# Patient Record
Sex: MALE | Race: BLACK OR AFRICAN AMERICAN | Employment: FULL TIME | URBAN - METROPOLITAN AREA
[De-identification: names, ages, dates, MRNs, and addresses within clinical notes are randomized per-mention and may not be internally consistent; named-entity substitution may affect disease eponyms.]

---

## 2017-06-26 ENCOUNTER — HOSPITAL ENCOUNTER (EMERGENCY)
Age: 22
Discharge: HOME | End: 2017-06-26
Attending: EMERGENCY MEDICINE
Payer: SELF-PAY

## 2017-06-26 VITALS
WEIGHT: 229.9 LBS | OXYGEN SATURATION: 100 % | DIASTOLIC BLOOD PRESSURE: 80 MMHG | SYSTOLIC BLOOD PRESSURE: 120 MMHG | HEART RATE: 72 BPM | BODY MASS INDEX: 37.11 KG/M2 | TEMPERATURE: 98.4 F | RESPIRATION RATE: 18 BRPM

## 2017-06-26 DIAGNOSIS — S61.219A LACERATION OF FINGER, INITIAL ENCOUNTER: Primary | ICD-10-CM

## 2017-06-26 PROCEDURE — 75810000293 HC SIMP/SUPERF WND  RPR

## 2017-06-26 PROCEDURE — 75810000294 HC INTERM/LAYERED WND RPR

## 2017-06-26 PROCEDURE — 99283 EMERGENCY DEPT VISIT LOW MDM: CPT

## 2017-06-26 PROCEDURE — 77030031132 HC SUT NYL COVD -A

## 2017-06-26 PROCEDURE — 77030018836 HC SOL IRR NACL ICUM -A

## 2017-06-26 PROCEDURE — 77030008323 HC SPLNT FNGR GTR DJOR -A

## 2017-06-26 NOTE — ED PROVIDER NOTES
Patient is a 24 y.o. male presenting with skin laceration. The history is provided by the patient and a relative. Laceration         Pt reports cutting his right fourth finger on can tonight. Tetanus up to date. Left hand dominant. Denies ETOh, tobacco, illicits. Past Medical History:   Diagnosis Date    Lead poisoning        History reviewed. No pertinent surgical history. History reviewed. No pertinent family history. Social History     Social History    Marital status: SINGLE     Spouse name: N/A    Number of children: N/A    Years of education: N/A     Occupational History    Not on file. Social History Main Topics    Smoking status: Never Smoker    Smokeless tobacco: Never Used    Alcohol use No    Drug use: No    Sexual activity: Not on file     Other Topics Concern    Not on file     Social History Narrative         ALLERGIES: Review of patient's allergies indicates no known allergies. Review of Systems   Skin: Positive for wound. All other systems reviewed and are negative. Vitals:    06/26/17 0033   BP: (!) 136/97   Pulse: 69   Resp: 15   Temp: 98.4 °F (36.9 °C)   SpO2: 99%   Weight: 104.3 kg (229 lb 14.4 oz)            Physical Exam   Constitutional: Vital signs are normal. He appears well-developed and well-nourished. He is active. Non-toxic appearance. He does not appear ill. No distress. HENT:   Head: Normocephalic and atraumatic. Neck: Normal range of motion. Neck supple. Carotid bruit is not present. No tracheal deviation present. No thyromegaly present. Cardiovascular: Normal rate, regular rhythm and normal heart sounds. Exam reveals no gallop and no friction rub. No murmur heard. Pulmonary/Chest: Effort normal and breath sounds normal. No stridor. No respiratory distress. He has no wheezes. He has no rales. He exhibits no tenderness. Abdominal: Soft. He exhibits no distension and no mass. There is no tenderness.  There is no rebound, no guarding and no CVA tenderness. Musculoskeletal: Normal range of motion. Neurological: He is alert. Skin: Skin is warm, dry and intact. He is not diaphoretic. No pallor. Right hand: fourth finger's proximal phalanx on the palmar aspect there is a horizontal laceration, approx 1cm in length, not actively bleeding. FROM. Sensation intact. Cap refill <2 sec. Psychiatric: He has a normal mood and affect. His speech is normal and behavior is normal. Judgment and thought content normal.   Nursing note and vitals reviewed. MDM  Number of Diagnoses or Management Options  Laceration of finger, initial encounter:   Diagnosis management comments: Laceration repaired. Splint applied. Splint applied by nurse to right fourth finger; excellent position. N/v intact before and after application. ED Course       Wound Repair  Date/Time: 6/26/2017 1:28 AM  Performed by: Robin Winters provider: Alden Bell  Preparation: skin prepped with Betadine  Location details: right ring finger  Wound length:2.5 cm or less  Anesthesia: local infiltration    Anesthesia:  Anesthesia: local infiltration  Local Anesthetic: lidocaine 1% without epinephrine   Anesthetic total: 2 mL  Foreign bodies: no foreign bodies  Irrigation solution: saline  Irrigation method: syringe  Debridement: none  Fascia closure: 5-0 nylon  Number of sutures: 3  Technique: simple and interrupted  Dressing: splint  Patient tolerance: Patient tolerated the procedure well with no immediate complications  My total time at bedside, performing this procedure was 1-15 minutes. 1:29 AM  Diagnosis:   1.  Laceration of finger, initial encounter          Disposition: home    Follow-up Information     Follow up With Details Comments MD Mauricio Schedule an appointment as soon as possible for a visit in 2 weeks For wound re-check, For suture removal [address] [place] [address] [phone] [facility] EMERGENCY DEPT  If symptoms worsen return immediately [address]  [phone]          Patient's Medications    No medications on file

## 2017-06-26 NOTE — ED NOTES
Reviewed DC instructions with patient. Pt verbalized an understanding. Pt instructed to follow up with PCP in 10-14 days. Pt signed paper DC instructions; RN placed in scan bin. Pt left ED with no distress noted. Pt left ED with all belongings.

## 2017-06-26 NOTE — DISCHARGE INSTRUCTIONS

## 2017-07-13 ENCOUNTER — HOSPITAL ENCOUNTER (EMERGENCY)
Age: 22
Discharge: HOME | End: 2017-07-13
Attending: EMERGENCY MEDICINE
Payer: SELF-PAY

## 2017-07-13 VITALS
BODY MASS INDEX: 36.96 KG/M2 | SYSTOLIC BLOOD PRESSURE: 142 MMHG | HEART RATE: 72 BPM | OXYGEN SATURATION: 96 % | TEMPERATURE: 99.2 F | DIASTOLIC BLOOD PRESSURE: 85 MMHG | WEIGHT: 229 LBS | RESPIRATION RATE: 20 BRPM

## 2017-07-13 DIAGNOSIS — Z48.02 VISIT FOR SUTURE REMOVAL: Primary | ICD-10-CM

## 2017-07-13 PROCEDURE — 75810000275 HC EMERGENCY DEPT VISIT NO LEVEL OF CARE

## 2017-07-13 NOTE — ED NOTES
Pt instructed to follow up with his PCP, to keep wound clean/dry/covered/antibiotic use BID for next week, and to return for worsening pain/discomfort/other concerns. Instructed to avoid back trauma/follow up with orthopedics/return for incontinence/difficulty weight bearing/other concerns.

## 2017-07-13 NOTE — DISCHARGE INSTRUCTIONS
Learning About Stitches and Staples Removal  When are stitches and staples removed? Your doctor will tell you when to have your stitches or staples removed, usually in 7 to 14 days. How long you'll be told to wait will depend on things like where the wound is located, how big and how deep the wound is, and what your general health is like. Do not remove the stitches on your own. Stitches on the face are usually removed within a week. But stitches and staples on other areas of the body, such as on the back or belly or over a joint, may need to stay in place longer, often a week or two. Be sure to follow your doctor's instructions. How are stitches and staples removed? It usually doesn't hurt when the doctor removes the stitches or staples. You may feel a tug as each stitch or staple is removed. · You will either be seated or lying down. · To remove stitches, the doctor will use scissors to cut each of the knots and then pull the threads out. · To remove staples, the doctor will use a tool to take out the staples one at a time. · The area may still feel tender after the stitches or staples are gone. But it should feel better within a few minutes or up to a few hours. What can you expect after stitches and staples are removed? Depending on the type and location of the cut, you will have a scar. Scars usually fade over time. Keep the area clean, but you won't need a bandage. When should you call for help? Call your doctor now or seek immediate medical care if:  · You have new pain, or your pain gets worse. · You have trouble moving the area near the scar. · You have symptoms of infection, such as:  ¨ Increased pain, swelling, warmth, or redness around the scar. ¨ Red streaks leading from the scar. ¨ Pus draining from the scar. ¨ A fever. Watch closely for changes in your health, and be sure to contact your doctor if:  · The scar opens. · You do not get better as expected.   Follow-up care is a key part of your treatment and safety. Be sure to make and go to all appointments, and call your doctor if you do not get better as expected. It's also a good idea to keep a list of the medicines you take. Where can you learn more? Go to http://honey-adrien.info/. Enter P186 in the search box to learn more about \"Learning About Stitches and Staples Removal.\"  Current as of: March 20, 2017  Content Version: 11.3  © 6122-0521 [employer]. Care instructions adapted under license by [employer] (which disclaims liability or warranty for this information). If you have questions about a medical condition or this instruction, always ask your healthcare professional. [address] any warranty or liability for your use of this information.

## 2017-07-13 NOTE — ED PROVIDER NOTES
HPI Comments: 21yoM to ED for suture removal. Had #3 sutures placed on right 4th finger on 6/26. States 1 suture fell out. Denies pain, bleeding, swelling, drainage, numbness, tingling, or any other complaints or symptoms. Patient is a 24 y.o. male presenting with suture removal.   Suture Removal          Past Medical History:   Diagnosis Date    Lead poisoning        No past surgical history on file. No family history on file. Social History     Social History    Marital status: SINGLE     Spouse name: N/A    Number of children: N/A    Years of education: N/A     Occupational History    Not on file. Social History Main Topics    Smoking status: Never Smoker    Smokeless tobacco: Never Used    Alcohol use No    Drug use: No    Sexual activity: Not on file     Other Topics Concern    Not on file     Social History Narrative         ALLERGIES: Review of patient's allergies indicates no known allergies. Review of Systems   Skin: Positive for wound. All other systems reviewed and are negative. Vitals:    07/13/17 1327   BP: 142/85   Pulse: 72   Resp: 20   Temp: 99.2 °F (37.3 °C)   SpO2: 96%   Weight: 103.9 kg (229 lb)            Physical Exam   Constitutional: He appears well-developed and well-nourished. No distress. HENT:   Head: Normocephalic and atraumatic. Musculoskeletal:        Hands:  Skin: He is not diaphoretic. MDM  Number of Diagnoses or Management Options  Diagnosis management comments: #2 sutures removed w/o complications. Wound care instructions given. Questions answered/concerns addressed.  JOYA Phan 1:33 PM         Amount and/or Complexity of Data Reviewed  Review and summarize past medical records: yes    Risk of Complications, Morbidity, and/or Mortality  Presenting problems: low  Diagnostic procedures: minimal  Management options: low    Patient Progress  Patient progress: improved    ED Course       Suture/Staple Removal  Date/Time: 7/13/2017 1:31 PM  Performed by: Tomas Araujo  Authorized by: Tomas Araujo     Consent:     Consent obtained:  Verbal    Consent given by:  Patient    Risks discussed:  Wound separation, pain and bleeding    Alternatives discussed:  Delayed treatment and no treatment  Location:     Location:  Upper extremity    Upper extremity location:  Hand    Hand location:  R ring finger  Procedure details:     Wound appearance:  No signs of infection (wound CDI)    Number of sutures removed:  2  Post-procedure details:     Post-removal:  Band-Aid applied    Patient tolerance of procedure:   Tolerated well, no immediate complications

## 2018-07-24 ENCOUNTER — HOSPITAL ENCOUNTER (OUTPATIENT)
Dept: LAB | Age: 23
Discharge: HOME | End: 2018-07-24

## 2018-07-24 ENCOUNTER — OFFICE VISIT (OUTPATIENT)
Dept: FAMILY MEDICINE CLINIC | Age: 23
End: 2018-07-24

## 2018-07-24 VITALS
SYSTOLIC BLOOD PRESSURE: 126 MMHG | TEMPERATURE: 97.8 F | OXYGEN SATURATION: 96 % | RESPIRATION RATE: 12 BRPM | HEIGHT: 66 IN | DIASTOLIC BLOOD PRESSURE: 88 MMHG | WEIGHT: 236.4 LBS | HEART RATE: 71 BPM | BODY MASS INDEX: 37.99 KG/M2

## 2018-07-24 DIAGNOSIS — M25.512 CHRONIC LEFT SHOULDER PAIN: ICD-10-CM

## 2018-07-24 DIAGNOSIS — G89.29 CHRONIC LEFT SHOULDER PAIN: ICD-10-CM

## 2018-07-24 DIAGNOSIS — Z13.220 SCREENING FOR LIPID DISORDERS: ICD-10-CM

## 2018-07-24 DIAGNOSIS — F51.01 PRIMARY INSOMNIA: Primary | ICD-10-CM

## 2018-07-24 DIAGNOSIS — G89.29 CHRONIC LEFT-SIDED LOW BACK PAIN WITHOUT SCIATICA: ICD-10-CM

## 2018-07-24 DIAGNOSIS — Z83.3 FAMILY HISTORY OF DIABETES MELLITUS: ICD-10-CM

## 2018-07-24 DIAGNOSIS — M54.50 CHRONIC LEFT-SIDED LOW BACK PAIN WITHOUT SCIATICA: ICD-10-CM

## 2018-07-24 LAB — SENTARA SPECIMEN COL,SENBCF: NORMAL

## 2018-07-24 PROCEDURE — 99001 SPECIMEN HANDLING PT-LAB: CPT | Performed by: PHYSICIAN ASSISTANT

## 2018-07-24 RX ORDER — TRAZODONE HYDROCHLORIDE 50 MG/1
50 TABLET ORAL
Qty: 30 TAB | Refills: 0 | Status: SHIPPED | OUTPATIENT
Start: 2018-07-24 | End: 2018-08-30

## 2018-07-24 NOTE — PATIENT INSTRUCTIONS

## 2018-07-24 NOTE — MR AVS SNAPSHOT
[address] 
[phone] Patient: Arthur Willard. MRN: [id number] :1995 Visit Information Date & Time Provider Department Dept. Phone Encounter #  
 2018  9:00 AM Nancy Kc, [address] [place] [address] [id number] Follow-up Instructions Return in about 1 month (around 2018) for CPE and Results Review. Upcoming Health Maintenance Date Due DTaP/Tdap/Td series (1 - Tdap) 2015 Influenza Age 5 to Adult 2018 Allergies as of 2018  Review Complete On: 2018 By: JOYA Ashford No Known Allergies Current Immunizations  Never Reviewed No immunizations on file. Not reviewed this visit You Were Diagnosed With   
  
 Codes Comments Primary insomnia    -  Primary ICD-10-CM: F51.01 
ICD-9-CM: 307.42 Chronic left shoulder pain     ICD-10-CM: M25.512, G89.29 ICD-9-CM: 719.41, 338.29 Chronic left-sided low back pain without sciatica     ICD-10-CM: M54.5, G89.29 ICD-9-CM: 724.2, 338.29 Screening for lipid disorders     ICD-10-CM: Z13.220 ICD-9-CM: V77.91 Family history of diabetes mellitus     ICD-10-CM: Z83.3 ICD-9-CM: V18.0 BMI 38.0-38.9,adult     ICD-10-CM: A37.15 
ICD-9-CM: V85.38 Vitals BP Pulse Temp Resp Height(growth percentile) Weight(growth percentile) 126/88 (BP 1 Location: Left arm, BP Patient Position: Sitting) 71 97.8 °F (36.6 °C) (Oral) 12 5' 6\" (1.676 m) 236 lb 6.4 oz (107.2 kg) SpO2 BMI Smoking Status 96% 38.16 kg/m2 Never Smoker Vitals History BMI and BSA Data Body Mass Index Body Surface Area  
 38.16 kg/m 2 2.23 m 2 Preferred Pharmacy Pharmacy Name Phone [facility] [address] [place] [address]. [phone] Your Updated Medication List  
  
   
 This list is accurate as of 7/24/18  9:46 AM.  Always use your most recent med list.  
  
  
  
  
 traZODone 50 mg tablet Commonly known as:  Barbra Au Take 1 Tab by mouth nightly. Prescriptions Sent to Pharmacy Refills  
 traZODone (DESYREL) 50 mg tablet 0 Sig: Take 1 Tab by mouth nightly. Class: Normal  
 Pharmacy: [facility] DR MONY SHAW [address] [place] [address].  #: [phone] Route: Oral  
  
We Performed the Following METABOLIC PANEL, COMPREHENSIVE [91774 CPT(R)] REFERRAL TO PHYSICAL THERAPY [ADA05 Custom] Follow-up Instructions Return in about 1 month (around 8/24/2018) for CPE and Results Review. To-Do List   
 07/24/2018 Lab:  HEMOGLOBIN A1C W/O EAG Around 07/25/2018 Lab:  LIPID PANEL Referral Information Referral ID Referred By Referred To  
  
 [id number] [address] [place] [address] Phone: [phone] Fax: [phone] Visits Status Start Date End Date 1 New Request 7/24/18 7/24/19 If your referral has a status of pending review or denied, additional information will be sent to support the outcome of this decision. Patient Instructions A Healthy Lifestyle: Care Instructions Your Care Instructions A healthy lifestyle can help you feel good, stay at a healthy weight, and have plenty of energy for both work and play. A healthy lifestyle is something you can share with your whole family. A healthy lifestyle also can lower your risk for serious health problems, such as high blood pressure, heart disease, and diabetes. You can follow a few steps listed below to improve your health and the health of your family. Follow-up care is a key part of your treatment and safety.  Be sure to make and go to all appointments, and call your doctor if you are having problems. It's also a good idea to know your test results and keep a list of the medicines you take. How can you care for yourself at home? · Do not eat too much sugar, fat, or fast foods. You can still have dessert and treats now and then. The goal is moderation. · Start small to improve your eating habits. Pay attention to portion sizes, drink less juice and soda pop, and eat more fruits and vegetables. ¨ Eat a healthy amount of food. A 3-ounce serving of meat, for example, is about the size of a deck of cards. Fill the rest of your plate with vegetables and whole grains. ¨ Limit the amount of soda and sports drinks you have every day. Drink more water when you are thirsty. ¨ Eat at least 5 servings of fruits and vegetables every day. It may seem like a lot, but it is not hard to reach this goal. A serving or helping is 1 piece of fruit, 1 cup of vegetables, or 2 cups of leafy, raw vegetables. Have an apple or some carrot sticks as an afternoon snack instead of a candy bar. Try to have fruits and/or vegetables at every meal. 
· Make exercise part of your daily routine. You may want to start with simple activities, such as walking, bicycling, or slow swimming. Try to be active 30 to 60 minutes every day. You do not need to do all 30 to 60 minutes all at once. For example, you can exercise 3 times a day for 10 or 20 minutes. Moderate exercise is safe for most people, but it is always a good idea to talk to your doctor before starting an exercise program. 
· Keep moving. Polly Bonds the lawn, work in the garden, or [facility]. Take the stairs instead of the elevator at work. · If you smoke, quit. People who smoke have an increased risk for heart attack, stroke, cancer, and other lung illnesses. Quitting is hard, but there are ways to boost your chance of quitting tobacco for good. ¨ Use nicotine gum, patches, or lozenges. ¨ Ask your doctor about stop-smoking programs and medicines. ¨ Keep trying. In addition to reducing your risk of diseases in the future, you will notice some benefits soon after you stop using tobacco. If you have shortness of breath or asthma symptoms, they will likely get better within a few weeks after you quit. · Limit how much alcohol you drink. Moderate amounts of alcohol (up to 2 drinks a day for men, 1 drink a day for women) are okay. But drinking too much can lead to liver problems, high blood pressure, and other health problems. Family health If you have a family, there are many things you can do together to improve your health. · Eat meals together as a family as often as possible. · Eat healthy foods. This includes fruits, vegetables, lean meats and dairy, and whole grains. · Include your family in your fitness plan. Most people think of activities such as jogging or tennis as the way to fitness, but there are many ways you and your family can be more active. Anything that makes you breathe hard and gets your heart pumping is exercise. Here are some tips: 
¨ Walk to do errands or to take your child to school or the bus. ¨ Go for a family bike ride after dinner instead of watching TV. Where can you learn more? Go to http://honeyAudiodraftadrien.info/. Enter H135 in the search box to learn more about \"A Healthy Lifestyle: Care Instructions. \" Current as of: December 7, 2017 Content Version: 11.7 © 0224-5834 [employer]. Care instructions adapted under license by [employer] (which disclaims liability or warranty for this information). If you have questions about a medical condition or this instruction, always ask your healthcare professional. [place] [address] any warranty or liability for your use of this information. Introducing [facility]! Bryan Cramer introduces [employer] patient portal. Now you can access parts of your medical record, email your doctor's office, and request medication refills online. 1. In your internet browser, go to [email or web address] 2. Click on the First Time User? Click Here link in the Sign In box. You will see the New Member Sign Up page. 3. Enter your [email or web address] Access Code exactly as it appears below. You will not need to use this code after youve completed the sign-up process. If you do not sign up before the expiration date, you must request a new code. · [email or web address] Access Code: [email or web address] Expires: 10/22/2018  9:04 AM 
 
4. Enter the last four digits of your Social Security Number (xxxx) and Date of Birth (mm/dd/yyyy) as indicated and click Submit. You will be taken to the next sign-up page. 5. Create a FlockOfBirdst ID. This will be your [email or web address] login ID and cannot be changed, so think of one that is secure and easy to remember. 6. Create a [email or web address] password. You can change your password at any time. 7. Enter your Password Reset Question and Answer. This can be used at a later time if you forget your password. 8. Enter your e-mail address. You will receive e-mail notification when new information is available in [address] 9. Click Sign Up. You can now view and download portions of your medical record. 10. Click the Download Summary menu link to download a portable copy of your medical information. If you have questions, please visit the Frequently Asked Questions section of the [email or web address] website. Remember, [email or web address] is NOT to be used for urgent needs. For medical emergencies, dial 911. Now available from your iPhone and Android! Please provide this summary of care documentation to your next provider. Your primary care clinician is listed as Lam Daugherty. If you have any questions after today's visit, please call [phone].

## 2018-07-24 NOTE — PROGRESS NOTES
Patient: Ja Bates MRN: [id number]  SSN: [id number]    YOB: 1995  Age: 20 y.o. Sex: male      Date of Service: 7/24/2018   Provider: JOYA Cervantes   Office Location:   [address] [place] [address], [address]  Office Phone: [phone]  Office Fax: [phone]        REASON FOR VISIT:   Chief Complaint   Patient presents with    Establish Care    Insomnia    Shoulder Pain     3 years    Back Pain        VITALS:   Visit Vitals    /88 (BP 1 Location: Left arm, BP Patient Position: Sitting)  Comment: manual    Pulse 71    Temp 97.8 °F (36.6 °C) (Oral)    Resp 12    Ht 5' 6\" (1.676 m)    Wt 236 lb 6.4 oz (107.2 kg)    SpO2 96%    BMI 38.16 kg/m2       MEDICATIONS:   No current meds    ALLERGIES:   No Known Allergies     MEDICAL/SURGICAL HISTORY:  Past Medical History:   Diagnosis Date    Lead poisoning       History reviewed. No pertinent surgical history. FAMILY HISTORY:  Family History   Problem Relation Age of Onset    Diabetes Mother     Heart Failure Mother     Hypertension Mother     COPD Father     Diabetes Brother         SOCIAL HISTORY:  Social History   Substance Use Topics    Smoking status: Never Smoker    Smokeless tobacco: Never Used    Alcohol use No            HISTORY OF PRESENT ILLNESS:   Ja Bates is a 20 y.o. male who presents to the office to establish care as a new patient. He was referred to me by his mother, Lion Hobbs, who is also my patient. The patient has not had routine primary care in several years, and though he has been in generally good health, has a few concerns today as follows:     History of Lead Poisoning -   Patient reports a history of lead toxicity as a child, which has lead to some developmental delays, however he has done quite well overall and has been taking college level classes at [employer].      Insomnia -   Patient reports sleep difficulties since childhood. He blames this on being allowed to stay up late to watch cartoons. He does not have a regular bedtime and often goes to sleep somewhere between 2-4 am. He has trouble falling asleep and staying asleep. He will wake up around 11 am usually, and thinks maybe gets a total of 4-5 hours per night. He does not feel rested when he wakes up. He does not know if he snores. He has tried taking his brother's Seroquel with some relief. Shoulder/Back Pain -   Patient complains of various aches and pains which are chronic. He sustained some injuries playing football as a child, and recreationally as an adult. He injured his shoulder playing \"street football\" with some friends at [facility] in 2014. He states he had an x-ray and nothing was broken or dislocated, but the shoulder \"hasn't been right\" since. Pain seems to be along the clavicle and anterior shoulder. He gets pain with pushups and bicep curls. This is frustrating him as he is trying to get back in shape. Patient also reports some chronic left sided low back pain which radiates into his left hip. He cannot recall a specific injury that triggered this. No associated numbness, tingling, or weakness of the extremities. Family History of DM -   Patient's family members are known to me and there is a strong history of poorly controlled diabetes. Patient himself has never been tested, though he has used his mom's glucometer a few times and states his sugar is generally < 100. REVIEW OF SYSTEMS:  ROS (see scanned H&P form for complete 12 point ROS)     PHYSICAL EXAMINATION:  Physical Exam   Constitutional: He is oriented to person, place, and time and well-developed, well-nourished, and in no distress. HENT:   Head: Normocephalic and atraumatic.    Right Ear: External ear normal.   Left Ear: External ear normal.   Nose: Nose normal.   Mouth/Throat: Oropharynx is clear and moist.   Eyes: Conjunctivae are normal. Pupils are equal, round, and reactive to light. Right eye exhibits no discharge. Left eye exhibits no discharge. Neck: Neck supple. No thyromegaly present. Cardiovascular: Normal rate, regular rhythm and normal heart sounds. Exam reveals no gallop and no friction rub. No murmur heard. Pulmonary/Chest: Effort normal and breath sounds normal. No stridor. He has no wheezes. He has no rales. Musculoskeletal:        Left shoulder: He exhibits no tenderness, no swelling, no effusion, no crepitus, no deformity and no spasm. Lumbar back: He exhibits no bony tenderness, no edema and no deformity. Lymphadenopathy:     He has no cervical adenopathy. Neurological: He is alert and oriented to person, place, and time. Gait normal.   Skin: Skin is warm and dry. No rash noted. Psychiatric: Mood, memory and affect normal.        RESULTS:  No results found for this visit on 07/24/18. ASSESSMENT/PLAN:  Diagnoses and all orders for this visit:    1. Primary insomnia  - Trial of trazodone as below  Orders:    -     traZODone (DESYREL) 50 mg tablet; Take 1 Tab by mouth nightly. 2. Chronic left shoulder pain  3. Chronic left-sided low back pain without sciatica  - Refer to physical therapy  Orders:    -     [address]    4. Screening for lipid disorders  Orders:    -     LIPID PANEL; Future    5. Family history of diabetes mellitus  Orders:    -     HEMOGLOBIN A1C W/O EAG; Future  -     METABOLIC PANEL, COMPREHENSIVE    6. BMI 38.0-38.9,adult  - Healthy lifestyle handout included in AVS   - Check routine fasting labs  Orders:    -     HEMOGLOBIN A1C W/O EAG; Future  -     METABOLIC PANEL, COMPREHENSIVE  -     LIPID PANEL; Future          Follow-up Disposition:  Return in about 1 month (around 8/24/2018) for CPE and Results Review.        PATIENT CARE TEAM:   Patient Care Team:  JOYA Irving as PCP - General (Physician Assistant)       JOYA Irving   July 24, 2018    11:47 AM

## 2018-07-24 NOTE — PROGRESS NOTES
Chief Complaint   Patient presents with    Establish Care    Insomnia    Shoulder Pain     3 years    Back Pain     Visit Vitals    /88 (BP 1 Location: Left arm, BP Patient Position: Sitting)    Pulse 71    Temp 97.8 °F (36.6 °C) (Oral)    Resp 12    Ht 5' 6\" (1.676 m)    Wt 236 lb 6.4 oz (107.2 kg)    SpO2 96%    BMI 38.16 kg/m2       Patient is fasting. Patient in room # 7.     1. Have you been to the ER, urgent care clinic since your last visit? Hospitalized since your last visit? No    2. Have you seen or consulted any other health care providers outside of the [facility] since your last visit? Include any pap smears or colon screening. No    HM Reviewed. Flowsheet, Learning needs and PHQ completed.

## 2018-07-25 DIAGNOSIS — Z13.220 SCREENING FOR LIPID DISORDERS: ICD-10-CM

## 2018-07-25 LAB
A-G RATIO,AGRAT: 1.3 RATIO (ref 1.1–2.6)
ALBUMIN SERPL-MCNC: 4.5 G/DL (ref 3.5–5)
ALP SERPL-CCNC: 44 U/L (ref 25–115)
ALT SERPL-CCNC: 20 U/L (ref 5–40)
ANION GAP SERPL CALC-SCNC: 18 MMOL/L
AST SERPL W P-5'-P-CCNC: 15 U/L (ref 10–37)
AVG GLU, 10930: 113 MG/DL (ref 91–123)
BILIRUB SERPL-MCNC: 0.4 MG/DL (ref 0.2–1.2)
BUN SERPL-MCNC: 13 MG/DL (ref 6–22)
CALCIUM SERPL-MCNC: 9.9 MG/DL (ref 8.4–10.4)
CHLORIDE SERPL-SCNC: 97 MMOL/L (ref 98–110)
CHOLEST SERPL-MCNC: 226 MG/DL (ref 110–200)
CO2 SERPL-SCNC: 24 MMOL/L (ref 20–32)
CREAT SERPL-MCNC: 1.1 MG/DL (ref 0.5–1.2)
GFRAA, 66117: >60
GFRNA, 66118: >60
GLOBULIN,GLOB: 3.4 G/DL (ref 2–4)
GLUCOSE SERPL-MCNC: 90 MG/DL (ref 70–99)
HBA1C MFR BLD HPLC: 5.6 % (ref 4.8–5.9)
HDLC SERPL-MCNC: 42 MG/DL (ref 40–59)
HDLC SERPL-MCNC: 5.4 MG/DL (ref 0–5)
LDLC SERPL CALC-MCNC: 148 MG/DL (ref 50–99)
POTASSIUM SERPL-SCNC: 4.7 MMOL/L (ref 3.5–5.5)
PROT SERPL-MCNC: 7.9 G/DL (ref 6.4–8.3)
SODIUM SERPL-SCNC: 139 MMOL/L (ref 133–145)
TRIGL SERPL-MCNC: 178 MG/DL (ref 40–149)
VLDLC SERPL CALC-MCNC: 36 MG/DL (ref 8–30)

## 2018-08-01 ENCOUNTER — TELEPHONE (OUTPATIENT)
Dept: FAMILY MEDICINE CLINIC | Age: 23
End: 2018-08-01

## 2018-08-01 NOTE — TELEPHONE ENCOUNTER
Diogo Lobo [phone] for patient to call [facility].   There are [address] locations for PT.

## 2018-08-01 NOTE — TELEPHONE ENCOUNTER
Per patient he prefers [facility]. Referral updated in [facility]. Patient advised that he will be contacted to schedule an appt. He was asked to call FP if he does not receive a call to schedule.

## 2018-08-01 NOTE — TELEPHONE ENCOUNTER
Pt called and would like to have his PT office closer to home on [address]. Please call pt at your earliest convenience.

## 2018-08-30 ENCOUNTER — HOSPITAL ENCOUNTER (OUTPATIENT)
Dept: GENERAL RADIOLOGY | Age: 23
Discharge: HOME | End: 2018-08-30
Payer: SELF-PAY

## 2018-08-30 ENCOUNTER — OFFICE VISIT (OUTPATIENT)
Dept: FAMILY MEDICINE CLINIC | Age: 23
End: 2018-08-30

## 2018-08-30 VITALS
HEART RATE: 69 BPM | DIASTOLIC BLOOD PRESSURE: 72 MMHG | SYSTOLIC BLOOD PRESSURE: 128 MMHG | OXYGEN SATURATION: 95 % | TEMPERATURE: 98.3 F | RESPIRATION RATE: 12 BRPM | BODY MASS INDEX: 36.64 KG/M2 | WEIGHT: 228 LBS | HEIGHT: 66 IN

## 2018-08-30 DIAGNOSIS — G89.29 CHRONIC BILATERAL LOW BACK PAIN WITH BILATERAL SCIATICA: ICD-10-CM

## 2018-08-30 DIAGNOSIS — Z91.89 HISTORY OF LEAD POISONING: ICD-10-CM

## 2018-08-30 DIAGNOSIS — E66.01 SEVERE OBESITY (BMI 35.0-39.9): ICD-10-CM

## 2018-08-30 DIAGNOSIS — F51.01 PRIMARY INSOMNIA: ICD-10-CM

## 2018-08-30 DIAGNOSIS — E78.5 DYSLIPIDEMIA: ICD-10-CM

## 2018-08-30 DIAGNOSIS — M54.42 CHRONIC BILATERAL LOW BACK PAIN WITH BILATERAL SCIATICA: ICD-10-CM

## 2018-08-30 DIAGNOSIS — M54.41 CHRONIC BILATERAL LOW BACK PAIN WITH BILATERAL SCIATICA: ICD-10-CM

## 2018-08-30 DIAGNOSIS — Z00.00 ROUTINE PHYSICAL EXAMINATION: Primary | ICD-10-CM

## 2018-08-30 PROCEDURE — 72100 X-RAY EXAM L-S SPINE 2/3 VWS: CPT

## 2018-08-30 RX ORDER — NORTRIPTYLINE HYDROCHLORIDE 25 MG/1
CAPSULE ORAL
Qty: 60 CAP | Refills: 1 | Status: SHIPPED | OUTPATIENT
Start: 2018-08-30 | End: 2019-07-17

## 2018-08-30 NOTE — PATIENT INSTRUCTIONS

## 2018-08-30 NOTE — PROGRESS NOTES
Patient: Mingo Grissom. MRN: [id number]  SSN: [id number] YOB: 1995  Age: 24 y.o. Sex: male Date of Service: 8/30/2018 Provider: JOYA Robles Office Location:  
[address] [place] [address], [address] [place], [address] Office Phone: [phone] Office Fax: [phone] REASON FOR VISIT:  
Chief Complaint Patient presents with  Complete Physical  
 Results  Other Concentration/Focus VITALS:  
Visit Vitals  /72 (BP 1 Location: Right arm, BP Patient Position: Sitting)  Pulse 69  Temp 98.3 °F (36.8 °C) (Oral)  Resp 12  Ht 5' 6\" (1.676 m)  Wt 228 lb (103.4 kg)  SpO2 95%  BMI 36.8 kg/m2 MEDICATIONS:  
Current Outpatient Prescriptions on File Prior to Visit Medication Sig Dispense Refill  traZODone (DESYREL) 50 mg tablet Take 1 Tab by mouth nightly. 30 Tab 0 No current facility-administered medications on file prior to visit. ALLERGIES:  
No Known Allergies PAST MEDICAL HISTORY: 
Past Medical History:  
Diagnosis Date  Lead poisoning SURGICAL HISTORY: 
No past surgical history on file. FAMILY HISTORY: 
Family History Problem Relation Age of Onset  Diabetes Mother  Heart Failure Mother  Hypertension Mother  COPD Father  Diabetes Brother SOCIAL HISTORY: 
Social History Social History  Marital status: SINGLE Spouse name: N/A  
 Number of children: N/A  
 Years of education: N/A Social History Main Topics  Smoking status: Never Smoker  Smokeless tobacco: Never Used  Alcohol use No  
 Drug use: No  
 Sexual activity: No  
 
Other Topics Concern  Not on file Social History Narrative HEALTH MAINTENANCE: 
Health Maintenance Topic Date Due  
 DTaP/Tdap/Td series (1 - Tdap) 07/27/2015  Influenza Age 5 to Adult  08/01/2018 HPI: 
 Feli Blanton is a 24 y.o. male who presents to the office for a routine health maintenance exam. 
 
Insomnia - Patient reports trazodone is not really working. He goes to bed around 10 pm, and tosses and turns for a while before falling asleep. He will then wake up around 2 am and have trouble getting back to sleep Back Pain -  
Patient continues to complain of low back pain that radiates into his legs, especially when he is jogging. He would like to get some imaging. He states he never heard from physical therapy. H/O Lead toxicity - Patient reports he had some developmental delays secondary to lead poisoning as a child. he'd like to check his lead levels today REVIEW OF SYSTEMS:  
Review of Systems Constitutional: Negative for chills, fever and malaise/fatigue. HENT: Negative for congestion. Eyes: Negative for blurred vision and double vision. Respiratory: Negative for cough, shortness of breath and wheezing. Cardiovascular: Negative for chest pain, palpitations and leg swelling. Gastrointestinal: Negative for abdominal pain, constipation, diarrhea, nausea and vomiting. Genitourinary: Negative for dysuria and urgency. Musculoskeletal: Positive for back pain. Skin: Negative for itching and rash. Neurological: Negative for dizziness, sensory change and headaches. Endo/Heme/Allergies: Negative for environmental allergies. Psychiatric/Behavioral: Negative for depression. The patient has insomnia. The patient is not nervous/anxious. PHYSICAL EXAMINATION:  
Physical Exam  
Constitutional: He is oriented to person, place, and time and well-developed, well-nourished, and in no distress. HENT:  
Head: Normocephalic and atraumatic.   
Right Ear: External ear normal.  
Left Ear: External ear normal.  
Nose: Nose normal.  
Mouth/Throat: Oropharynx is clear and moist.  
Eyes: Conjunctivae are normal. Pupils are equal, round, and reactive to light. Right eye exhibits no discharge. Left eye exhibits no discharge. Neck: Neck supple. Cardiovascular: Normal rate, regular rhythm and normal heart sounds. Exam reveals no gallop and no friction rub. No murmur heard. Pulmonary/Chest: Effort normal and breath sounds normal. No stridor. He has no wheezes. He has no rales. Abdominal: Soft. Bowel sounds are normal. He exhibits no distension. There is no tenderness. There is no rebound and no guarding. Lymphadenopathy:  
  He has no cervical adenopathy. Neurological: He is alert and oriented to person, place, and time. Gait normal.  
Skin: Skin is warm and dry. No rash noted. Psychiatric: Mood, memory and affect normal.  
  
 
RESULTS:  
Lab Results Component Value Date/Time Sodium 139 07/24/2018 04:32 AM  
 Potassium 4.7 07/24/2018 04:32 AM  
 Chloride 97 (L) 07/24/2018 04:32 AM  
 CO2 24 07/24/2018 04:32 AM  
 Anion gap 18.0 07/24/2018 04:32 AM  
 Glucose 90 07/24/2018 04:32 AM  
 BUN 13 07/24/2018 04:32 AM  
 Creatinine 1.1 07/24/2018 04:32 AM  
 Calcium 9.9 07/24/2018 04:32 AM  
 Bilirubin, total 0.4 07/24/2018 04:32 AM  
 AST (SGOT) 15 07/24/2018 04:32 AM  
 Alk. phosphatase 44 07/24/2018 04:32 AM  
 Protein, total 7.9 07/24/2018 04:32 AM  
 Albumin 4.5 07/24/2018 04:32 AM  
 Globulin 3.4 07/24/2018 04:32 AM  
 A-G Ratio 1.3 07/24/2018 04:32 AM  
 ALT (SGPT) 20 07/24/2018 04:32 AM  
  
Lab Results Component Value Date/Time Cholesterol, total 226 (H) 07/24/2018 04:32 AM  
 HDL Cholesterol 42 07/24/2018 04:32 AM  
 LDL, calculated 148 (H) 07/24/2018 04:32 AM  
 VLDL, calculated 36 (H) 07/24/2018 04:32 AM  
 Triglyceride 178 (H) 07/24/2018 04:32 AM  
  
Lab Results Component Value Date/Time Hemoglobin A1c 5.6 07/24/2018 04:32 AM  
  
 
 
ASSESSMENT/PLAN: 
Diagnoses and all orders for this visit: 1. Routine physical examination - Normal physical exam findings as detailed above - Reviewed lab results - Age and gender specific counseling provided 2. Dyslipidemia - Reviewed results of labs with patient - Given his age and that he is otherwise healthy and low risk at this time for a cardiovascular event, I'd suggest monitoring for now - Weight loss through diet and exercise encouraged 3. Primary insomnia - Trazodone not helping. Patient would like to try something different - Will send nortriptyline 25 mg qhs. If not improving after 1-2 weeks, can increase to 50 mg qhs. Orders:   
-     nortriptyline (PAMELOR) 25 mg capsule; Take 2 caps nightly for sleep. 4. History of lead poisoning - Discussed with patient that assuming his lead poisoning was adequately treated, there is really no need for ongoing surveillance, though I do think it would be reasonable to check a CBC 
- He would like to check a lead level but will confirm with his insurance company that it is covered first 
Orders:   
-     LEAD, ADULT, WHOLE BLOOD; Future -     CBC WITH AUTOMATED DIFF; Future 5. Chronic bilateral low back pain with bilateral sciatica - X-ray ordered - Patient was referred to PT las month. Encouraged him to call to follow up Orders:   
-     XR SPINE LUMB 2 OR 3 V; Future 6. Severe obesity (BMI 35.0-39.9) ([address] - Diet and exercise  
- Healthy lifestyle handout included in AVS  
 
 
 
Follow-up Disposition: 
Return in about 3 months (around 11/30/2018) for Insomnia.   
 
JOYA Altamirano 
8/30/2018  
3:32 PM

## 2018-08-30 NOTE — MR AVS SNAPSHOT
[address] 
[phone] Patient: Ja Avila. MRN: [id number] :1995 Visit Information Date & Time Provider Department Dept. Phone Encounter #  
 2018  3:00 PM Jazmin Vargas, [address] [place] [address] [id number] Follow-up Instructions Return in about 3 months (around 2018) for Insomnia. Upcoming Health Maintenance Date Due DTaP/Tdap/Td series (1 - Tdap) 2015 Influenza Age 5 to Adult 2018 Allergies as of 2018  Review Complete On: 2018 By: JOYA Cervantes No Known Allergies Current Immunizations  Never Reviewed No immunizations on file. Not reviewed this visit You Were Diagnosed With   
  
 Codes Comments Routine physical examination    -  Primary ICD-10-CM: Z00.00 ICD-9-CM: V70.0 Dyslipidemia     ICD-10-CM: E78.5 ICD-9-CM: 272.4 Primary insomnia     ICD-10-CM: F51.01 
ICD-9-CM: 307.42 History of lead poisoning     ICD-10-CM: Z91.89 ICD-9-CM: V15.6 Chronic bilateral low back pain with bilateral sciatica     ICD-10-CM: M54.42, M54.41, G89.29 ICD-9-CM: 724.2, 724.3, 338.29 Severe obesity (BMI 35.0-39.9) ([facility])     ICD-10-CM: E66.01 
ICD-9-CM: 278.01 Vitals BP Pulse Temp Resp Height(growth percentile) Weight(growth percentile) 128/72 (BP 1 Location: Right arm, BP Patient Position: Sitting) 69 98.3 °F (36.8 °C) (Oral) 12 5' 6\" (1.676 m) 228 lb (103.4 kg) SpO2 BMI Smoking Status 95% 36.8 kg/m2 Never Smoker Vitals History BMI and BSA Data Body Mass Index Body Surface Area  
 36.8 kg/m 2 2.19 m 2 Preferred Pharmacy Pharmacy Name Phone [address] [facility] [address] [place] [address]. [phone] Your Updated Medication List  
  
   
 This list is accurate as of 8/30/18  3:32 PM.  Always use your most recent med list.  
  
  
  
  
 nortriptyline 25 mg capsule Commonly known as:  PAMELOR Take 2 caps nightly for sleep. Prescriptions Sent to Pharmacy Refills  
 nortriptyline (PAMELOR) 25 mg capsule 1 Sig: Take 2 caps nightly for sleep. Class: Normal  
 Pharmacy: [facility] [address] MONY SH[facility] [id number]  #: [phone] Follow-up Instructions Return in about 3 months (around 11/30/2018) for Insomnia. To-Do List   
 08/30/2018 Lab:  LEAD, ADULT, WHOLE BLOOD   
  
 08/30/2018 Imaging:  XR SPINE LUMB 2 OR 3 V Around 08/31/2018 Lab:  CBC WITH AUTOMATED DIFF Patient Instructions A Healthy Lifestyle: Care Instructions Your Care Instructions A healthy lifestyle can help you feel good, stay at a healthy weight, and have plenty of energy for both work and play. A healthy lifestyle is something you can share with your whole family. A healthy lifestyle also can lower your risk for serious health problems, such as high blood pressure, heart disease, and diabetes. You can follow a few steps listed below to improve your health and the health of your family. Follow-up care is a key part of your treatment and safety. Be sure to make and go to all appointments, and call your doctor if you are having problems. It's also a good idea to know your test results and keep a list of the medicines you take. How can you care for yourself at home? · Do not eat too much sugar, fat, or fast foods. You can still have dessert and treats now and then. The goal is moderation. · Start small to improve your eating habits. Pay attention to portion sizes, drink less juice and soda pop, and eat more fruits and vegetables. ¨ Eat a healthy amount of food. A 3-ounce serving of meat, for example, is about the size of a deck of cards.  Fill the rest of your plate with vegetables and whole grains. ¨ Limit the amount of soda and sports drinks you have every day. Drink more water when you are thirsty. ¨ Eat at least 5 servings of fruits and vegetables every day. It may seem like a lot, but it is not hard to reach this goal. A serving or helping is 1 piece of fruit, 1 cup of vegetables, or 2 cups of leafy, raw vegetables. Have an apple or some carrot sticks as an afternoon snack instead of a candy bar. Try to have fruits and/or vegetables at every meal. 
· Make exercise part of your daily routine. You may want to start with simple activities, such as walking, bicycling, or slow swimming. Try to be active 30 to 60 minutes every day. You do not need to do all 30 to 60 minutes all at once. For example, you can exercise 3 times a day for 10 or 20 minutes. Moderate exercise is safe for most people, but it is always a good idea to talk to your doctor before starting an exercise program. 
· Keep moving. Airam Solanodo the lawn, work in the garden, or [employer]. Take the stairs instead of the elevator at work. · If you smoke, quit. People who smoke have an increased risk for heart attack, stroke, cancer, and other lung illnesses. Quitting is hard, but there are ways to boost your chance of quitting tobacco for good. ¨ Use nicotine gum, patches, or lozenges. ¨ Ask your doctor about stop-smoking programs and medicines. ¨ Keep trying. In addition to reducing your risk of diseases in the future, you will notice some benefits soon after you stop using tobacco. If you have shortness of breath or asthma symptoms, they will likely get better within a few weeks after you quit. · Limit how much alcohol you drink. Moderate amounts of alcohol (up to 2 drinks a day for men, 1 drink a day for women) are okay. But drinking too much can lead to liver problems, high blood pressure, and other health problems. Family health If you have a family, there are many things you can do together to improve your health. · Eat meals together as a family as often as possible. · Eat healthy foods. This includes fruits, vegetables, lean meats and dairy, and whole grains. · Include your family in your fitness plan. Most people think of activities such as jogging or tennis as the way to fitness, but there are many ways you and your family can be more active. Anything that makes you breathe hard and gets your heart pumping is exercise. Here are some tips: 
¨ Walk to do errands or to take your child to school or the bus. ¨ Go for a family bike ride after dinner instead of watching TV. Where can you learn more? Go to http://honeyRoozz.comadrien.info/. Enter G061 in the search box to learn more about \"A Healthy Lifestyle: Care Instructions. \" Current as of: December 7, 2017 Content Version: 11.7 © 7376-6626 [employer]. Care instructions adapted under license by [employer] (which disclaims liability or warranty for this information). If you have questions about a medical condition or this instruction, always ask your healthcare professional. [address] any warranty or liability for your use of this information. Introducing [facility]! Nancy Fregoso introduces [employer] patient portal. Now you can access parts of your medical record, email your doctor's office, and request medication refills online. 1. In your internet browser, go to [email or web address] 2. Click on the First Time User? Click Here link in the Sign In box. You will see the New Member Sign Up page. 3. Enter your [facility] Access Code exactly as it appears below. You will not need to use this code after youve completed the sign-up process. If you do not sign up before the expiration date, you must request a new code. · [facility] Access Code: [email or web address] Expires: 10/22/2018  9:04 AM 
 
4.  Enter the last four digits of your Social Security Number (xxxx) and Date of Birth (mm/dd/yyyy) as indicated and click Submit. You will be taken to the next sign-up page. 5. Create a [employer] ID. This will be your [employer] login ID and cannot be changed, so think of one that is secure and easy to remember. 6. Create a [employer] password. You can change your password at any time. 7. Enter your Password Reset Question and Answer. This can be used at a later time if you forget your password. 8. Enter your e-mail address. You will receive e-mail notification when new information is available in [address] 9. Click Sign Up. You can now view and download portions of your medical record. 10. Click the Download Summary menu link to download a portable copy of your medical information. If you have questions, please visit the Frequently Asked Questions section of the [employer] website. Remember, [employer] is NOT to be used for urgent needs. For medical emergencies, dial 911. Now available from your iPhone and Android! Please provide this summary of care documentation to your next provider. Your primary care clinician is listed as Kasey Marques. If you have any questions after today's visit, please call [phone].

## 2018-08-30 NOTE — PROGRESS NOTES
Spine x-ray was normal. Suspect his pain is more likely related to muscle spasm.  I'd suggest following up with physical therapy as we had discussed

## 2018-08-30 NOTE — PROGRESS NOTES
Chief Complaint Patient presents with  Complete Physical  
 Results  Other Concentration/Focus Patient is not fasting. Patient in room # 5.  
 
1. Have you been to the ER, urgent care clinic since your last visit? Hospitalized since your last visit? No 
 
2. Have you seen or consulted any other health care providers outside of the [address] [place] [address] since your last visit? Include any pap smears or colon screening. No 
 
 Reviewed.

## 2018-08-31 DIAGNOSIS — Z91.89 HISTORY OF LEAD POISONING: ICD-10-CM

## 2018-08-31 NOTE — PROGRESS NOTES
Called home number. Left message on answering machine to return the call. This call was concerning message below per Bhanu HINSON.

## 2018-08-31 NOTE — PROGRESS NOTES
Called home number. Verified name and . Spoke with patient. Patient notified of message below per Nelda HINSON. Patient understood the reason for call and had no further questions or concerns.

## 2019-07-17 ENCOUNTER — OFFICE VISIT (OUTPATIENT)
Dept: FAMILY MEDICINE CLINIC | Age: 24
End: 2019-07-17

## 2019-07-17 VITALS
BODY MASS INDEX: 34.39 KG/M2 | WEIGHT: 214 LBS | OXYGEN SATURATION: 98 % | HEIGHT: 66 IN | SYSTOLIC BLOOD PRESSURE: 132 MMHG | TEMPERATURE: 98 F | HEART RATE: 57 BPM | DIASTOLIC BLOOD PRESSURE: 86 MMHG | RESPIRATION RATE: 16 BRPM

## 2019-07-17 DIAGNOSIS — Z83.3 FAMILY HISTORY OF DIABETES MELLITUS: ICD-10-CM

## 2019-07-17 DIAGNOSIS — E78.5 DYSLIPIDEMIA: ICD-10-CM

## 2019-07-17 DIAGNOSIS — F51.01 PRIMARY INSOMNIA: Primary | ICD-10-CM

## 2019-07-17 RX ORDER — TRAZODONE HYDROCHLORIDE 100 MG/1
100 TABLET ORAL
Qty: 30 TAB | Refills: 0 | Status: SHIPPED | OUTPATIENT
Start: 2019-07-17 | End: 2020-01-13 | Stop reason: ALTCHOICE

## 2019-07-17 NOTE — PROGRESS NOTES
1. Have you been to the ER, urgent care clinic since your last visit? Hospitalized since your last visit? No    2. Have you seen or consulted any other health care providers outside of the [address] [place] [address] since your last visit? Include any pap smears or colon screening.  No

## 2019-07-17 NOTE — PROGRESS NOTES
Patient: Devin Quiros MRN: [id number]  SSN: [id number]    YOB: 1995  Age: 24 y.o. Sex: male      Date of Service: 7/17/2019   Provider: JOYA Rabago   Office Location:   [address] [place] [address] Dr Janes ngo, [address]  Office Phone: [phone]  Office Fax: [phone]      REASON FOR VISIT:   Chief Complaint   Patient presents with    Cholesterol Problem    Insomnia        VITALS:   Visit Vitals  /86 (BP 1 Location: Left arm, BP Patient Position: Sitting)   Pulse (!) 57   Temp 98 °F (36.7 °C) (Oral)   Resp 16   Ht 5' 6\" (1.676 m)   Wt 214 lb (97.1 kg)   SpO2 98%   BMI 34.54 kg/m²        MEDICATIONS:   No current outpatient medications on file prior to visit. No current facility-administered medications on file prior to visit. ALLERGIES:   No Known Allergies     MEDICAL/SURGICAL HISTORY:  Past Medical History:   Diagnosis Date    Lead poisoning       History reviewed. No pertinent surgical history. FAMILY HISTORY:  Family History   Problem Relation Age of Onset    Diabetes Mother     Heart Failure Mother     Hypertension Mother     COPD Father     Diabetes Brother         SOCIAL HISTORY:  Social History     Tobacco Use    Smoking status: Never Smoker    Smokeless tobacco: Never Used   Substance Use Topics    Alcohol use: No    Drug use: No           HISTORY OF PRESENT ILLNESS: Devin Quiros is a 24 y.o. male who presents to the office for a routine follow up visit. Here today to discuss insomnia. Patient reports he is still struggling to fall asleep at night. He will typically go to bed around 10 pm, but may take until midnight or later to fall asleep. He also mentions that he is a relatively light sleeper, so he does sometimes wake up in the middle of the night if he hears a noise. He will then have a hard time falling back to sleep.  Patient states that his sleep patterns vary, and some days he wakes up feeling more well rested than others. His typical evening routine is as follows:  Exercises from 6-7 pm, then eats dinner, followed by homework/catching up on emails in bed. Will typically turn the lights off around 10. He reports feeling well today and denies any other physical complaints. States that he has been making an effort to make healthier food choices and exercise more, and is pleased to see that he has lost some weight. REVIEW OF SYSTEMS:  Review of Systems   Constitutional: Negative for chills and fever. Respiratory: Negative for cough and shortness of breath. Cardiovascular: Negative for chest pain and palpitations. Neurological: Negative for headaches. Psychiatric/Behavioral: Negative for depression. The patient has insomnia. The patient is not nervous/anxious. PHYSICAL EXAMINATION:  Physical Exam   Constitutional: He is oriented to person, place, and time and well-developed, well-nourished, and in no distress. Cardiovascular: Normal rate, regular rhythm and normal heart sounds. Exam reveals no gallop and no friction rub. No murmur heard. Pulmonary/Chest: Effort normal and breath sounds normal. He has no wheezes. He has no rales. Neurological: He is alert and oriented to person, place, and time. Gait normal.   Skin: Skin is warm and dry. No rash noted. Psychiatric: Mood, memory and affect normal.        RESULTS:  No results found for this visit on 07/17/19. ASSESSMENT/PLAN:  Diagnoses and all orders for this visit:    1. Primary insomnia  - Has tried both low dose trazodone and nortriptyline in the past with minimal relief. Will try increasing trazodone to 100 mg   - Check labs  - The majority of today's visit was spent discussing sleep hygiene. I've encouraged him NOT to do his homework/work in bed at the end of the day, but rather outside the bedroom if possible. Reserve the bed only for sleep.  Encouraged limiting screen time and trying a relaxing activity such as light recreational reading or listening to music in the 30-60 min before bedtime  Orders:    -     TSH 3RD GENERATION; Future  -     CBC WITH AUTOMATED DIFF; Future  -     traZODone (DESYREL) 100 mg tablet; Take 1 Tab by mouth nightly. 2. Dyslipidemia  3. BMI 34.0-34.9,adult  4. Family history of diabetes mellitus  - Doing great with weight loss through diet and exercise  - Update fasting labs in preparation for CPE which is due next month  Orders:    -     METABOLIC PANEL, COMPREHENSIVE; Future  -     LIPID PANEL; Future  -     HEMOGLOBIN A1C W/O EAG; Future      Follow-up and Dispositions    · Return in about 1 month (around 8/17/2019) for f/u 1 month for CPE after 08/30/2019. All questions answered. Patient expresses understanding and agrees with the plan as detailed above.     PATIENT CARE TEAM:   Patient Care Team:  JOYA Guerin as PCP - General (Physician Assistant)       JOYA Avila   July 17, 2019   1:54 PM

## 2019-07-18 DIAGNOSIS — F51.01 PRIMARY INSOMNIA: ICD-10-CM

## 2019-09-03 ENCOUNTER — TELEPHONE (OUTPATIENT)
Dept: FAMILY MEDICINE CLINIC | Age: 24
End: 2019-09-03

## 2019-10-05 ENCOUNTER — HOSPITAL ENCOUNTER (EMERGENCY)
Age: 24
Discharge: HOME | End: 2019-10-06
Attending: EMERGENCY MEDICINE
Payer: COMMERCIAL

## 2019-10-05 VITALS
WEIGHT: 215 LBS | BODY MASS INDEX: 34.55 KG/M2 | OXYGEN SATURATION: 98 % | RESPIRATION RATE: 16 BRPM | SYSTOLIC BLOOD PRESSURE: 162 MMHG | HEART RATE: 64 BPM | HEIGHT: 66 IN | TEMPERATURE: 97.3 F | DIASTOLIC BLOOD PRESSURE: 84 MMHG

## 2019-10-05 DIAGNOSIS — S09.90XA INJURY OF HEAD, INITIAL ENCOUNTER: Primary | ICD-10-CM

## 2019-10-05 PROCEDURE — 99281 EMR DPT VST MAYX REQ PHY/QHP: CPT

## 2019-10-05 NOTE — LETTER
NOTIFICATION OF RETURN TO WORK 
 
10/6/2019 1:42 AM 
 
Mr. Washington Gautam. [place] [id number] Liang Torres To Whom It May Concern: 
 
Washington Gautam. was under the care of [facility]. He will be able to return to work on Sunday, 10/13/19. If there are questions or concerns please have the patient contact our office. Sincerely, Kash Hernandez PA-C

## 2019-10-06 ENCOUNTER — APPOINTMENT (OUTPATIENT)
Dept: CT IMAGING | Age: 24
End: 2019-10-06
Attending: PHYSICIAN ASSISTANT
Payer: COMMERCIAL

## 2019-10-06 PROCEDURE — 70450 CT HEAD/BRAIN W/O DYE: CPT

## 2019-10-06 RX ORDER — ACETAMINOPHEN 325 MG/1
650 TABLET ORAL
Qty: 20 TAB | Refills: 0 | Status: SHIPPED | OUTPATIENT
Start: 2019-10-06 | End: 2020-10-06 | Stop reason: ALTCHOICE

## 2019-10-06 NOTE — ED PROVIDER NOTES
EMERGENCY DEPARTMENT HISTORY AND PHYSICAL EXAM    1:42 AM      Date: 10/5/2019  Patient Name: Mihaela Dias. History of Presenting Illness     Chief Complaint   Patient presents with    Head Injury       History Provided By: Patient    Chief Complaint: head injury  Duration: 1.5 Days  Timing:  Acute  Location:   Quality: Aching  Severity: 7 out of 10  Modifying Factors: none  Associated Symptoms: denies any other associated signs or symptoms      Additional History (Context):Soraya Mcintosh. is a 21 y.o. male who presents to the emergency department for evaluation of head injury which occurred yesterday while at work. Patient states he was bent over picking something up, stood up and hit the left side of his head against a shelf. He denies LOC and was initially feeling fine. Today however, he states he is been experiencing intermittent nausea, lightheadedness, and headaches. Patient denies any treatment prior to arrival.  He is not on any blood thinners. He denies any other medical problems. No other injuries or recent illnesses. PCP:  JOYA Bustamante      Current Outpatient Medications   Medication Sig Dispense Refill    acetaminophen (TYLENOL) 325 mg tablet Take 2 Tabs by mouth every four (4) hours as needed for Pain. 20 Tab 0    traZODone (DESYREL) 100 mg tablet Take 1 Tab by mouth nightly. 30 Tab 0       Past History     Past Medical History:  Past Medical History:   Diagnosis Date    Lead poisoning        Past Surgical History:  History reviewed. No pertinent surgical history.     Family History:  Family History   Problem Relation Age of Onset    Diabetes Mother     Heart Failure Mother     Hypertension Mother     COPD Father     Diabetes Brother        Social History:  Social History     Tobacco Use    Smoking status: Never Smoker    Smokeless tobacco: Never Used   Substance Use Topics    Alcohol use: No    Drug use: No       Allergies:  No Known Allergies      Review of Systems       Review of Systems   Constitutional: Negative for chills and fever. HENT: Negative for congestion, rhinorrhea and sore throat. Respiratory: Negative for cough and shortness of breath. Cardiovascular: Negative for chest pain. Gastrointestinal: Positive for nausea. Negative for abdominal pain, blood in stool, constipation, diarrhea and vomiting. Genitourinary: Negative for dysuria, frequency and hematuria. Musculoskeletal: Negative for back pain and myalgias. Skin: Negative for rash and wound. Neurological: Positive for light-headedness and headaches. Negative for dizziness. All other systems reviewed and are negative. Physical Exam     Visit Vitals  /84 (BP 1 Location: Right arm, BP Patient Position: At rest;Sitting)   Pulse 64   Temp 97.3 °F (36.3 °C)   Resp 16   Ht 5' 6\" (1.676 m)   Wt 97.5 kg (215 lb)   SpO2 98%   BMI 34.70 kg/m²       Physical Exam   Constitutional: He is oriented to person, place, and time. He appears well-developed and well-nourished. No distress. HENT:   Head: Normocephalic and atraumatic. Nose: Nose normal.   Mouth/Throat: Oropharynx is clear and moist. No oropharyngeal exudate. Eyes: Conjunctivae are normal. Right eye exhibits no discharge. Left eye exhibits no discharge. Neck: Normal range of motion. Neck supple. No thyromegaly present. Cardiovascular: Normal rate, regular rhythm and normal heart sounds. Pulmonary/Chest: Effort normal and breath sounds normal. No respiratory distress. He has no wheezes. He has no rales. He exhibits no tenderness. Abdominal: Soft. Bowel sounds are normal. He exhibits no distension. There is no tenderness. There is no rebound and no guarding. Musculoskeletal: Normal range of motion. He exhibits no edema or deformity. Lymphadenopathy:     He has no cervical adenopathy. Neurological: He is alert and oriented to person, place, and time. No cranial nerve deficit.  Coordination normal.   Pt is awake, alert, oriented x 3.  CNs 2-12 intact and normal.  No facial droop. Normal speech. Pt is answering questions and following commands appropriately. Normal finger to nose. Negative Romberg. Pt ambulatory with even, steady gait, moving BUE and BLE with equal strength and intact distal neurovascular status. Skin: Skin is warm and dry. No rash noted. He is not diaphoretic. Psychiatric: He has a normal mood and affect. Nursing note and vitals reviewed. Diagnostic Study Results     Labs -  No results found for this or any previous visit (from the past 12 hour(s)). Radiologic Studies -   Ct Head Wo Cont    Result Date: 10/6/2019  CT of the head without contrast HISTORY: Head injury COMPARISON: 7/27/2007 All CT scans at this facility are performed using dose optimization technique as appropriate to a performed exam, to include automated exposure control, adjustment of the MA and/or kV according to patient size (including appropriate matching for site-specific examinations) or use of  iterative reconstruction technique. FINDINGS: No acute intracranial hemorrhage. Normal CSF spaces. No mass effect or obvious mass lesion. Normal grey white matter differentiation and brain attenuations. No identifiable acute infarct. Visualized paranasal sinuses and mastoid air cells are clear. No acute osseous abnormality. Impression: Brain looks normal.       Medical Decision Making   I am the first provider for this patient. I reviewed the vital signs, available nursing notes, past medical history, past surgical history, family history and social history. Vital Signs-Reviewed the patient's vital signs.     Pulse Oximetry Analysis -  98% on room air (Interpretation)    Records Reviewed: Nursing Notes and Old Medical Records (Time of Review: 1:42 AM)    ED Course: Progress Notes, Reevaluation, and Consults:    Provider Notes (Medical Decision Making):   differential diagnosis:  Headache, concussion, ICB, unlikely skull fracture    Plan: Patient presents ambulatory in no significant distress with mildly elevated blood pressure and otherwise normal vitals. He has no headache currently. No abnormalities noted on physical exam.  Neurologically intact. CT head negative and injury occurred approximately 36 hours ago. At this point, I have a very low suspicion for developing intracranial hemorrhage. Patient nevertheless advised on warning signs and symptoms as well as when to return to the ED. Diagnosis     Clinical Impression:   1. Injury of head, initial encounter        Disposition: CT Home    Follow-up Information     Follow up With Specialties Details Why Contact Info    JOYA Brandon Physician Assistant Call in 2 days As needed Bart Quinones [place]      [facility] Emergency Medicine Go to As needed, If symptoms worsen [address] [place] [id number]  [phone]           Patient's Medications   Start Taking    ACETAMINOPHEN (TYLENOL) 325 MG TABLET    Take 2 Tabs by mouth every four (4) hours as needed for Pain. Continue Taking    TRAZODONE (DESYREL) 100 MG TABLET    Take 1 Tab by mouth nightly. These Medications have changed    No medications on file   Stop Taking    No medications on file     _______________________________    This note was dictated utilizing voice recognition software which may lead to typographical errors. I apologize in advance if the situation occurs. If questions arise please do not hesitate to contact me or call our department.   Nic Rome PA-C

## 2019-10-06 NOTE — ED TRIAGE NOTES
Patient arrived triage complaining of head injury. Patient stated that he was at work, bent over to get something off the floor and hit his head on a shelf. Patient denies LOC.

## 2019-10-06 NOTE — DISCHARGE INSTRUCTIONS
Patient Education        Learning About a Closed Head Injury  What is a closed head injury? A closed head injury happens when your head gets hit hard. The strong force of the blow causes your brain to shake in your skull. This movement can cause the brain to bruise, swell, or tear. Sometimes nerves or blood vessels also get damaged. This can cause bleeding in or around the brain. A concussion is a type of closed head injury. What are the symptoms? If you have a mild concussion, you may have a mild headache or feel \"not quite right. \" These symptoms are common. They usually go away over a few days to 4 weeks. But sometimes after a concussion, you feel like you can't function as well as before the injury. And you have new symptoms. This is called postconcussive syndrome. You may:  · Find it harder to solve problems, think, concentrate, or remember. · Have headaches. · Have changes in your sleep patterns, such as not being able to sleep or sleeping all the time. · Have changes in your personality. · Not be interested in your usual activities. · Feel angry or anxious without a clear reason. · Lose your sense of taste or smell. · Be dizzy, lightheaded, or unsteady. It may be hard to stand or walk. How is a closed head injury treated? Any person who may have a concussion needs to see a doctor. Some people have to stay in the hospital to be watched. Others can go home safely. If you go home, follow your doctor's instructions. He or she will tell you if you need someone to watch you closely for the next 24 hours or longer. Rest is the best treatment. Get plenty of sleep at night. And try to rest during the day. · Avoid activities that are physically or mentally demanding. These include housework, exercise, and schoolwork. And don't play video games, send text messages, or use the computer. You may need to change your school or work schedule to be able to avoid these activities.   · Ask your doctor when it's okay to drive, ride a bike, or operate machinery. · Take an over-the-counter pain medicine, such as acetaminophen (Tylenol), ibuprofen (Advil, Motrin), or naproxen (Aleve). Be safe with medicines. Read and follow all instructions on the label. · Check with your doctor before you use any other medicines for pain. · Do not drink alcohol or use illegal drugs. They can slow recovery. They can also increase your risk of getting a second head injury. Follow-up care is a key part of your treatment and safety. Be sure to make and go to all appointments, and call your doctor if you are having problems. It's also a good idea to know your test results and keep a list of the medicines you take. Where can you learn more? Go to http://honey-adrien.info/. Enter E235 in the search box to learn more about \"Learning About a Closed Head Injury. \"  Current as of: March 28, 2019  Content Version: 12.2  © 1916-2484 [employer]. Care instructions adapted under license by [employer] (which disclaims liability or warranty for this information). If you have questions about a medical condition or this instruction, always ask your healthcare professional. [address] any warranty or liability for your use of this information.

## 2019-10-07 ENCOUNTER — TELEPHONE (OUTPATIENT)
Dept: FAMILY MEDICINE CLINIC | Age: 24
End: 2019-10-07

## 2019-10-07 NOTE — TELEPHONE ENCOUNTER
Pt was seen [facility] for a head injury from work. He states that he would need to be seen by his PCP. He states that he was ok for now but if anything comes up he will go back to [facility]. Please advise. If we can get him in.

## 2019-10-08 NOTE — TELEPHONE ENCOUNTER
If this is a work injury and under [facility], we do not manage those. He has to go through his HR department to find a worker's comp doctor. Forwarded to  to handle this one as it may be a bit complicated if we can see him or not.

## 2019-10-08 NOTE — TELEPHONE ENCOUNTER
Spoke with Doroteo at great length about ED visit on 10- for head injury. Stacy Craft was offered a 9:30 am with Dr. Kristian Medley on 10-, 3:15 pm with Dr. Thedora Fabry 10- and 11:15 am with Dr. Sofia Frazier on 11-. Stacy Craft declined all appointments stating he wanted to be seen now and there is no way he could possibly make any of those appointments. States he will either call back or go elsewhere.

## 2019-10-10 ENCOUNTER — OFFICE VISIT (OUTPATIENT)
Dept: ORTHOPEDIC SURGERY | Facility: CLINIC | Age: 24
End: 2019-10-10

## 2019-10-10 VITALS
OXYGEN SATURATION: 98 % | SYSTOLIC BLOOD PRESSURE: 131 MMHG | DIASTOLIC BLOOD PRESSURE: 87 MMHG | RESPIRATION RATE: 18 BRPM | TEMPERATURE: 97.3 F | HEART RATE: 68 BPM | HEIGHT: 66 IN | WEIGHT: 211 LBS | BODY MASS INDEX: 33.91 KG/M2

## 2019-10-10 DIAGNOSIS — G89.29 CHRONIC PAIN OF LEFT KNEE: ICD-10-CM

## 2019-10-10 DIAGNOSIS — M25.561 CHRONIC PAIN OF RIGHT KNEE: ICD-10-CM

## 2019-10-10 DIAGNOSIS — M22.41 CHONDROMALACIA OF BOTH PATELLAE: Primary | ICD-10-CM

## 2019-10-10 DIAGNOSIS — G89.29 CHRONIC LOW BACK PAIN, UNSPECIFIED BACK PAIN LATERALITY, UNSPECIFIED WHETHER SCIATICA PRESENT: ICD-10-CM

## 2019-10-10 DIAGNOSIS — M22.42 CHONDROMALACIA OF BOTH PATELLAE: Primary | ICD-10-CM

## 2019-10-10 DIAGNOSIS — M25.562 CHRONIC PAIN OF LEFT KNEE: ICD-10-CM

## 2019-10-10 DIAGNOSIS — M54.50 CHRONIC LOW BACK PAIN, UNSPECIFIED BACK PAIN LATERALITY, UNSPECIFIED WHETHER SCIATICA PRESENT: ICD-10-CM

## 2019-10-10 DIAGNOSIS — G89.29 CHRONIC PAIN OF RIGHT KNEE: ICD-10-CM

## 2019-10-10 DIAGNOSIS — M25.551 BILATERAL HIP PAIN: ICD-10-CM

## 2019-10-10 DIAGNOSIS — M54.50 LUMBAR PAIN: ICD-10-CM

## 2019-10-10 DIAGNOSIS — M25.552 BILATERAL HIP PAIN: ICD-10-CM

## 2019-10-10 NOTE — PROGRESS NOTES
Patient: Margot Bonds MRN: [id number]       SSN: [id number]  YOB: 1995        AGE: 21 y.o. SEX: male    PCP: JOYA Lau  10/10/19    Chief Complaint   Patient presents with    Knee Pain     Fuentes    Hip Pain     Fuentes   Back pain  HISTORY:  Margot Bonds is a 21 y.o. male who is seen for back, bilateral knee and bilateral hip pain. He has been experiencing knee pain for the past few months. He does not recall any specific injury, he thinks he aggravated his knees playing street football. He says he is too aggressive when he plays. He notes pain with standing, walking, and stair climbing. He experiences startup pain after sitting. He is also seen for bilateral hip and back pain. He has been experiencing back pain for the past few months. He thinks he injured his back after being chop blocked playing street football 3 years ago. He noticed pain 3 weeks afterwards when he stretched and heard a pop. He notes pain with standing and walking. He experiences buttock pain radiating to his hips. He experiences startup pain after sitting. Pain Assessment  10/10/2019   Location of Pain Hip   Location Modifiers Left;Right   Severity of Pain 0   Quality of Pain -   Duration of Pain -   Frequency of Pain -   Aggravating Factors -   Limiting Behavior No   Relieving Factors -   Result of Injury -     Occupation, etc:  Mr. 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 He lives in [place] with his mother. His father was a previous patient and passed away in [id number] from COPD complications. Mr. Patrice Padilla weighs 211 lbs and is 5'6\" tall.        Lab Results   Component Value Date/Time    Hemoglobin A1c 5.6 07/24/2018 04:32 AM     Weight Metrics 10/10/2019 10/5/2019 7/17/2019 8/30/2018 7/24/2018 7/13/2017 6/26/2017   Weight 211 lb 215 lb 214 lb 228 lb 236 lb 6.4 oz 229 lb 229 lb 14.4 oz   BMI 34.06 kg/m2 34.7 kg/m2 34.54 kg/m2 36.8 kg/m2 38.16 kg/m2 36.96 kg/m2 37.11 kg/m2       Patient Active Problem List   Diagnosis Code    BMI 38.0-38.9,adult Z68.38    Dyslipidemia E78.5    Severe obesity (BMI 35.0-39. 9) E66.01    Primary insomnia F51.01     REVIEW OF SYSTEMS: All Below are Negative except: See HPI   Constitutional: negative for fever, chills, and weight loss. Cardiovascular: negative for chest pain, claudication, leg swelling, SOB, ESTES   Gastrointestinal: Negative for pain, N/V/C/D, Blood in stool or urine, dysuria, hematuria, incontinence, pelvic pain. Musculoskeletal: See HPI   Neurological: Negative for dizziness and weakness. Negative for headaches, Visual changes, confusion, seizures   Phychiatric/Behavioral: Negative for depression, memory loss, substance abuse. Extremities: Negative for hair changes, rash, or skin lesion changes. Hematologic: Negative for bleeding problems, bruising, pallor or swollen lymph nodes   Peripheral Vascular: No calf pain, no circulation deficits.     Social History     Socioeconomic History    Marital status: SINGLE     Spouse name: Not on file    Number of children: Not on file    Years of education: Not on file    Highest education level: Not on file   Occupational History    Not on file   Social Needs    Financial resource strain: Not on file    Food insecurity:     Worry: Not on file     Inability: Not on file    Transportation needs:     Medical: Not on file     Non-medical: Not on file   Tobacco Use    Smoking status: Never Smoker    Smokeless tobacco: Never Used   Substance and Sexual Activity    Alcohol use: No    Drug use: No    Sexual activity: Never   Lifestyle    Physical activity:     Days per week: Not on file     Minutes per session: Not on file    Stress: Not on file   Relationships    Social connections:     Talks on phone: Not on file     Gets together: Not on file     Attends [employer] service: Not on file     Active member of club or organization: Not on file     Attends meetings of clubs or organizations: Not on file     Relationship status: Not on file    Intimate partner violence:     Fear of current or ex partner: Not on file     Emotionally abused: Not on file     Physically abused: Not on file     Forced sexual activity: Not on file   Other Topics Concern    Not on file   Social History Narrative    Not on file      No Known Allergies   Current Outpatient Medications   Medication Sig    acetaminophen (TYLENOL) 325 mg tablet Take 2 Tabs by mouth every four (4) hours as needed for Pain.  traZODone (DESYREL) 100 mg tablet Take 1 Tab by mouth nightly. No current facility-administered medications for this visit.        PHYSICAL EXAMINATION:  Visit Vitals  /87   Pulse 68   Temp 97.3 °F (36.3 °C) (Oral)   Resp 18   Ht 5' 6\" (1.676 m)   Wt 211 lb (95.7 kg)   SpO2 98%   BMI 34.06 kg/m²      ORTHO EXAMINATION:  Examination Right knee Left knee   Skin Intact Intact   Range of motion 130+5 130+5   Effusion - -   Medial joint line tenderness + +   Lateral joint line tenderness - -   Popliteal tenderness - -   Osteophytes palpable + +   Stans - -   Patella crepitus - -   Anterior drawer - -   Lateral laxity - -   Medial laxity - -   Varus deformity - -   Valgus deformity - -   Pretibial edema - -   Calf tenderness - -     Examination Right hip Left hip   Skin Intact Intact   External Rotation ROM 20 20   Internal Rotation ROM 10 10   Trochanteric tenderness - -   Hip flexion contracture - -   Antalgic gait - -   Trendelenberg sign - -   Lumbar tenderness - -   Straight leg raise - -   Calf tenderness - -   Neurovascular Intact Intact     Examination Lumbar Thoracic   Skin Intact Intact   Tenderness + -   Tightness - -   Lordosis Normal N/A   Kyphosis N/A Normal   Scoliosis - -   Flexion Fingertips to ankle N/A   Extension 10 N/A   Knee reflexes Normal N/A   Ankle reflexes Normal N/A   Straight leg raise - N/A   Calf tenderness - N/A RADIOGRAPHS:  XR LEFT KNEE 10/10/19 [facility]  IMPRESSION:  Three views - No fractures, no effusion, no joint space narrowing, no osteophytes present. Kellgren Regino grade 0    XR BILAT HIPS 10/10/19 [facility]  IMPRESSION:  AP pelvis and two views - No fractures, no joint space narrowing, no osteophytes present. Tonnis grade 0    IMPRESSION:      ICD-10-CM ICD-9-CM    1. Chondromalacia of both patellae M22.41 717.7 REFERRAL TO PHYSICAL THERAPY    M22.42     2. Chronic pain of right knee M25.561 719.46 REFERRAL TO PHYSICAL THERAPY    G89.29 338.29 CANCELED: AMB POC X-RAY KNEE 3 VIEW   3. Chronic pain of left knee M25.562 719.46 AMB POC X-RAY KNEE 3 VIEW    G89.29 338.29 REFERRAL TO PHYSICAL THERAPY   4. Bilateral hip pain M25.551 719.45 REFERRAL TO PHYSICAL THERAPY    M25.552  POC XRAY, PELVIS; 1 OR 2 VIEWS      CANCELED: AMB POC X-RAY HIPS, BILAT, 2+ VIEWS   5. Lumbar pain M54.5 724.2 REFERRAL TO SPINE SURGERY   6. Back pain M54.5 724.2     G89.29 338.29      PLAN:  He will start a brief course of outpatient physical therapy. There is no need for surgery at this time. He will follow up as needed.       Scribed by MD Bo Kruse) as dictated by Elmer Ruiz MD

## 2019-10-12 ENCOUNTER — HOSPITAL ENCOUNTER (EMERGENCY)
Age: 24
Discharge: HOME | End: 2019-10-12
Attending: EMERGENCY MEDICINE
Payer: COMMERCIAL

## 2019-10-12 VITALS
HEIGHT: 66 IN | SYSTOLIC BLOOD PRESSURE: 136 MMHG | HEART RATE: 68 BPM | WEIGHT: 211 LBS | OXYGEN SATURATION: 98 % | DIASTOLIC BLOOD PRESSURE: 90 MMHG | TEMPERATURE: 98.1 F | RESPIRATION RATE: 14 BRPM | BODY MASS INDEX: 33.91 KG/M2

## 2019-10-12 DIAGNOSIS — F07.81 POST CONCUSSIVE SYNDROME: Primary | ICD-10-CM

## 2019-10-12 PROCEDURE — 99281 EMR DPT VST MAYX REQ PHY/QHP: CPT

## 2019-10-12 RX ORDER — IBUPROFEN 600 MG/1
600 TABLET ORAL
Qty: 20 TAB | Refills: 0 | Status: SHIPPED | OUTPATIENT
Start: 2019-10-12 | End: 2020-10-06 | Stop reason: ALTCHOICE

## 2019-10-12 RX ORDER — KETOROLAC TROMETHAMINE 15 MG/ML
15 INJECTION, SOLUTION INTRAMUSCULAR; INTRAVENOUS
Status: DISCONTINUED | OUTPATIENT
Start: 2019-10-12 | End: 2019-10-12 | Stop reason: HOSPADM

## 2019-10-12 RX ORDER — ONDANSETRON 4 MG/1
TABLET, ORALLY DISINTEGRATING ORAL
Qty: 10 TAB | Refills: 0 | Status: SHIPPED | OUTPATIENT
Start: 2019-10-12 | End: 2020-01-13 | Stop reason: ALTCHOICE

## 2019-10-12 RX ORDER — ONDANSETRON 4 MG/1
4 TABLET, ORALLY DISINTEGRATING ORAL
Status: DISCONTINUED | OUTPATIENT
Start: 2019-10-12 | End: 2019-10-12 | Stop reason: HOSPADM

## 2019-10-12 NOTE — DISCHARGE INSTRUCTIONS
Patient Education        Postconcussion Syndrome: Care Instructions  Your Care Instructions    Postconcussion syndrome occurs after a blow to the head or body. Common symptoms are changes in the ability to concentrate, think, remember, or solve problems. Symptoms, which may include headaches, personality changes, and dizziness, may be related to stress from the events surrounding the accident that caused the injury. Follow-up care is a key part of your treatment and safety. Be sure to make and go to all appointments, and call your doctor if you are having problems. It's also a good idea to know your test results and keep a list of the medicines you take. How can you care for yourself at home? Pain  · Rest is the best treatment for postconcussion syndrome. · Do not drive if you have taken a prescription pain medicine. · Rest in a quiet, dark room until your headache is gone. Close your eyes and try to relax or go to sleep. Do not watch TV or read. · Put a cold, moist cloth or cold pack on the painful area for 10 to 20 minutes at a time. Put a thin cloth between the cold pack and your skin. · Have someone gently massage your neck and shoulders. · Take your medicines exactly as prescribed. Call your doctor if you think you are having a problem with your medicine. You will get more details on the specific medicines your doctor prescribes. Stress  · Try to reduce stress. Some ways to do this include:  ? Taking slow, deep breaths. ? Soaking in a warm bath. ? Listening to soothing music. ? Having a massage or back rub. ? Drinking a warm, nonalcoholic, noncaffeinated beverage. · Get enough sleep. · Eat a healthy, balanced diet. A balanced diet includes whole grains, dairy, fruits and vegetables, and protein. Eat a variety of foods from each of those groups so you get all the nutrients you need. · Avoid alcohol and illegal drugs.   · Try relaxation exercises, such as breathing and muscle relaxation exercises. · Talk to your doctor about counseling. It may help you deal with stress from your accident. When should you call for help? Watch closely for changes in your health, and be sure to contact your doctor if:    · You do not get better as expected.     · Your symptoms, such as headaches, trouble concentrating, or changes in mood, get worse. Where can you learn more? Go to http://honey-adrien.info/. Enter E623 in the search box to learn more about \"Postconcussion Syndrome: Care Instructions. \"  Current as of: March 28, 2019  Content Version: 12.2  © 1323-0449 [employer]. Care instructions adapted under license by [employer] (which disclaims liability or warranty for this information). If you have questions about a medical condition or this instruction, always ask your healthcare professional. [address] any warranty or liability for your use of this information.

## 2019-10-12 NOTE — ED TRIAGE NOTES
\"I hit my head at work last week. They told me to come back, if I still feel bad. I'm still feeling dizzy off and on and my left eye is twitching off and on too. \"

## 2019-10-12 NOTE — LETTER
[address] [place] [address] Dr [facility] EMERGENCY DEPT 
[address] [facility] [id number] [phone] Work/School Note Date: 10/12/2019 To Whom It May concern: 
 
Ramiro Hummel. was seen and treated today in the emergency room by the following provider(s): 
Attending Provider: Primo Luevano DO Physician Assistant: JOYA Hoang. Ramiro Hummel. may return to work on 10/13/19. Sincerely, JOYA Diaz

## 2019-10-12 NOTE — ED PROVIDER NOTES
[facility]  [facility] EMERGENCY DEPT      Date: 10/12/2019  Patient Name: Mitra Koehler. History of Presenting Illness     Chief Complaint   Patient presents with    Dizziness       21 y.o. male otherwise healthy presents to the ED c/o multiple symptoms following a head injury 1 week ago. Pt states he stood up quickly while at work 1 week ago when he struck his left parietal scalp on a shelf. States he does not have any LOC, but came here for severe headache. CT of his head was unremarkable. He notes having continued dizziness, nausea, and left eye \"twitching\" sensation for the past week. Patient states his eyes is not actually twitching when he looks it in the mirror, but it feels like it is. Patient has not taken anything for his symptoms. He notes being prescribed Tylenol, which she can try taking. He has not seen his PCP for this. Denies any numbness, weakness, slurred speech, abnormal behavior, vomiting, other symptoms at this time. Patient denies any other associated signs or symptoms. Patient denies any other complaints. Nursing notes regarding the HPI and triage nursing notes were reviewed. Prior medical records were reviewed. Current Facility-Administered Medications   Medication Dose Route Frequency Provider Last Rate Last Dose    ketorolac (TORADOL) injection 15 mg  15 mg IntraMUSCular NOW Jigna Newman PA        ondansetron (ZOFRAN ODT) tablet 4 mg  4 mg Oral NOW Jigna Newman PA         Current Outpatient Medications   Medication Sig Dispense Refill    ibuprofen (MOTRIN) 600 mg tablet Take 1 Tab by mouth every six (6) hours as needed for Pain. 20 Tab 0    ondansetron (ZOFRAN ODT) 4 mg disintegrating tablet Take 1-2 tablets every 6-8 hours as needed for nausea and vomiting. 10 Tab 0    acetaminophen (TYLENOL) 325 mg tablet Take 2 Tabs by mouth every four (4) hours as needed for Pain. 20 Tab 0    traZODone (DESYREL) 100 mg tablet Take 1 Tab by mouth nightly.  30 Tab 0       Past History     Past Medical History:  Past Medical History:   Diagnosis Date    Lead poisoning        Past Surgical History:  History reviewed. No pertinent surgical history. Family History:  Family History   Problem Relation Age of Onset    Diabetes Mother     Heart Failure Mother     Hypertension Mother     COPD Father     Diabetes Brother        Social History:  Social History     Tobacco Use    Smoking status: Never Smoker    Smokeless tobacco: Never Used   Substance Use Topics    Alcohol use: No    Drug use: No       Allergies:  No Known Allergies    Patient's primary care provider (as noted in EPIC):  JOYA Pizarro    Review of Systems   Constitutional:  Denies malaise, fever, chills. Head:  + injury. HENT: + left eye \"twitching. \"  Face:  Denies injury or pain. Neck:  Denies injury or pain. GI/ABD: + nausea. Denies vomiting. :  Denies injury, pain, dysuria or urgency. Back:  Denies injury or pain. Pelvis:  Denies injury or pain. Extremity/MS:  Denies injury or pain. Neuro: + headache. Denies LOC, dizziness, neurologic symptoms/deficits/paresthesias. Skin: Denies injury, rash, itching or skin changes. All other systems negative as reviewed. Visit Vitals  /90 (BP 1 Location: Left arm, BP Patient Position: At rest)   Pulse 68   Temp 98.1 °F (36.7 °C)   Resp 14   Ht 5' 6\" (1.676 m)   Wt 95.7 kg (211 lb)   SpO2 98%   BMI 34.06 kg/m²       PHYSICAL EXAM:    CONSTITUTIONAL:  Alert, in no apparent distress;  well developed;  well nourished. HEAD:  Normocephalic, atraumatic. EYES:  PERRL. EOMI. Non-icteric sclera. Normal conjunctiva. ENTM:  Mouth: mucous membranes moist.  NECK:  Supple  RESPIRATORY:  Chest clear, equal breath sounds, good air movement. Without wheezes, rhonchi or rales. CARDIOVASCULAR:  Regular rate and rhythm. No murmurs, rubs, or gallops. UPPER EXT:  Normal inspection. LOWER EXT:  No edema, no calf tenderness.   Distal pulses intact. NEURO:  Moves all four extremities. Normal motor exam and sensation in all four extremities. Normal CN II-XII exam.  Normal bilateral finger-to-nose exam.  SKIN:  No rashes;  Normal for age. PSYCH:  Alert and normal affect. MEDICAL DECISION MAKING:  Patient likely has postconcussive syndrome, as he is having the symptoms following head injury. Ct of his head on 10/5 was unremarkable. He does not have any neurologic deficit, he otherwise looks well. Patient is questioning about work. I informed him that as long as he does not operate any heavy machinery he may return to work on his next scheduled shift. He may follow-up with his regular doctor as well as neurology for this. I discussed this patient with my attending, Dr. Hector Quiroga, who agrees with the assessment and plan. Diagnosis:   1. Post concussive syndrome      Disposition: Discharge    Follow-up Information     Follow up With Specialties Details Why Contact Info    JOYA Brandon Physician Assistant In 3 days  [address] [address]  [id number] [address]      Aniya Reeves MD Neurology In 3 days  [address] [facility] [address] [address]      [facility] EMERGENCY DEPT Emergency Medicine  If symptoms worsen [address] [facility] [address]  [phone]          Patient's Medications   Start Taking    IBUPROFEN (MOTRIN) 600 MG TABLET    Take 1 Tab by mouth every six (6) hours as needed for Pain. ONDANSETRON (ZOFRAN ODT) 4 MG DISINTEGRATING TABLET    Take 1-2 tablets every 6-8 hours as needed for nausea and vomiting. Continue Taking    ACETAMINOPHEN (TYLENOL) 325 MG TABLET    Take 2 Tabs by mouth every four (4) hours as needed for Pain. TRAZODONE (DESYREL) 100 MG TABLET    Take 1 Tab by mouth nightly.    These Medications have changed    No medications on file   Stop Taking    No medications on file     JOYA Clarke

## 2019-11-07 ENCOUNTER — HOSPITAL ENCOUNTER (OUTPATIENT)
Dept: LAB | Age: 24
Discharge: HOME | End: 2019-11-07

## 2019-11-07 LAB — XX-LABCORP SPECIMEN COL,LCBCF: NORMAL

## 2019-11-07 PROCEDURE — 99001 SPECIMEN HANDLING PT-LAB: CPT

## 2019-11-08 LAB
ALBUMIN SERPL-MCNC: 4.4 G/DL (ref 3.5–5.5)
ALBUMIN/GLOB SERPL: 1.3 {RATIO} (ref 1.2–2.2)
ALP SERPL-CCNC: 54 IU/L (ref 39–117)
ALT SERPL-CCNC: 24 IU/L (ref 0–44)
AST SERPL-CCNC: 21 IU/L (ref 0–40)
BASOPHILS # BLD AUTO: 0 X10E3/UL (ref 0–0.2)
BASOPHILS NFR BLD AUTO: 1 %
BILIRUB SERPL-MCNC: 0.4 MG/DL (ref 0–1.2)
BUN SERPL-MCNC: 12 MG/DL (ref 6–20)
BUN/CREAT SERPL: 11 (ref 9–20)
CALCIUM SERPL-MCNC: 10 MG/DL (ref 8.7–10.2)
CHLORIDE SERPL-SCNC: 100 MMOL/L (ref 96–106)
CHOLEST SERPL-MCNC: 229 MG/DL (ref 100–199)
CO2 SERPL-SCNC: 25 MMOL/L (ref 20–29)
CREAT SERPL-MCNC: 1.06 MG/DL (ref 0.76–1.27)
EOSINOPHIL # BLD AUTO: 0.2 X10E3/UL (ref 0–0.4)
EOSINOPHIL NFR BLD AUTO: 4 %
ERYTHROCYTE [DISTWIDTH] IN BLOOD BY AUTOMATED COUNT: 13.6 % (ref 12.3–15.4)
GLOBULIN SER CALC-MCNC: 3.5 G/DL (ref 1.5–4.5)
GLUCOSE SERPL-MCNC: 87 MG/DL (ref 65–99)
HBA1C MFR BLD: 5.8 % (ref 4.8–5.6)
HCT VFR BLD AUTO: 47 % (ref 37.5–51)
HDLC SERPL-MCNC: 51 MG/DL
HGB BLD-MCNC: 15.5 G/DL (ref 13–17.7)
IMM GRANULOCYTES # BLD AUTO: 0 X10E3/UL (ref 0–0.1)
IMM GRANULOCYTES NFR BLD AUTO: 0 %
INTERPRETATION, 910389: NORMAL
LDLC SERPL CALC-MCNC: 159 MG/DL (ref 0–99)
LYMPHOCYTES # BLD AUTO: 2.2 X10E3/UL (ref 0.7–3.1)
LYMPHOCYTES NFR BLD AUTO: 47 %
MCH RBC QN AUTO: 25.5 PG (ref 26.6–33)
MCHC RBC AUTO-ENTMCNC: 33 G/DL (ref 31.5–35.7)
MCV RBC AUTO: 77 FL (ref 79–97)
MONOCYTES # BLD AUTO: 0.3 X10E3/UL (ref 0.1–0.9)
MONOCYTES NFR BLD AUTO: 6 %
NEUTROPHILS # BLD AUTO: 2 X10E3/UL (ref 1.4–7)
NEUTROPHILS NFR BLD AUTO: 42 %
PLATELET # BLD AUTO: 402 X10E3/UL (ref 150–450)
POTASSIUM SERPL-SCNC: 4.9 MMOL/L (ref 3.5–5.2)
PROT SERPL-MCNC: 7.9 G/DL (ref 6–8.5)
RBC # BLD AUTO: 6.08 X10E6/UL (ref 4.14–5.8)
SODIUM SERPL-SCNC: 138 MMOL/L (ref 134–144)
SPECIMEN STATUS REPORT, ROLRST: NORMAL
TRIGL SERPL-MCNC: 94 MG/DL (ref 0–149)
TSH SERPL DL<=0.005 MIU/L-ACNC: 0.57 UIU/ML (ref 0.45–4.5)
VLDLC SERPL CALC-MCNC: 19 MG/DL (ref 5–40)
WBC # BLD AUTO: 4.7 X10E3/UL (ref 3.4–10.8)

## 2019-11-26 NOTE — PROGRESS NOTES
Nurse to advise that labs suggest prediabetes . Please keep next appt with Hayden to review in depth.

## 2019-11-26 NOTE — PROGRESS NOTES
Patient identifiers verified. Patient advised that labs suggest prediabetes . Please keep next appt with Hayden to review in depth. He voices understanding.

## 2020-01-13 ENCOUNTER — OFFICE VISIT (OUTPATIENT)
Dept: FAMILY MEDICINE CLINIC | Age: 25
End: 2020-01-13

## 2020-01-13 VITALS
RESPIRATION RATE: 20 BRPM | HEIGHT: 66 IN | SYSTOLIC BLOOD PRESSURE: 130 MMHG | WEIGHT: 208 LBS | OXYGEN SATURATION: 98 % | TEMPERATURE: 98.7 F | HEART RATE: 75 BPM | DIASTOLIC BLOOD PRESSURE: 76 MMHG | BODY MASS INDEX: 33.43 KG/M2

## 2020-01-13 DIAGNOSIS — M54.50 CHRONIC BILATERAL LOW BACK PAIN WITHOUT SCIATICA: ICD-10-CM

## 2020-01-13 DIAGNOSIS — G89.29 CHRONIC BILATERAL LOW BACK PAIN WITHOUT SCIATICA: ICD-10-CM

## 2020-01-13 DIAGNOSIS — R73.03 PREDIABETES: ICD-10-CM

## 2020-01-13 DIAGNOSIS — Z13.31 POSITIVE DEPRESSION SCREENING: ICD-10-CM

## 2020-01-13 DIAGNOSIS — Z00.00 ROUTINE PHYSICAL EXAMINATION: Primary | ICD-10-CM

## 2020-01-13 DIAGNOSIS — R45.89 DEPRESSED MOOD: ICD-10-CM

## 2020-01-13 DIAGNOSIS — E78.5 DYSLIPIDEMIA: ICD-10-CM

## 2020-01-13 NOTE — PROGRESS NOTES
1. Have you been to the ER, urgent care clinic since your last visit? Hospitalized since your last visit? Yes 10- [facility] for head injury/eye twitching    2. Have you seen or consulted any other health care providers outside of the [address] [place] [address] since your last visit? Include any pap smears or colon screening.  No     3 most recent PHQ Screens 1/13/2020   PHQ Not Done -   Little interest or pleasure in doing things More than half the days   Feeling down, depressed, irritable, or hopeless More than half the days   Total Score PHQ 2 4   Trouble falling or staying asleep, or sleeping too much Nearly every day   Feeling tired or having little energy More than half the days   Poor appetite, weight loss, or overeating More than half the days   Feeling bad about yourself - or that you are a failure or have let yourself or your family down More than half the days   Trouble concentrating on things such as school, work, reading, or watching TV Several days   Moving or speaking so slowly that other people could have noticed; or the opposite being so fidgety that others notice Not at all   Thoughts of being better off dead, or hurting yourself in some way More than half the days   PHQ 9 Score 16   How difficult have these problems made it for you to do your work, take care of your home and get along with others Somewhat difficult     Please complete C-SSRS

## 2020-01-13 NOTE — PROGRESS NOTES
Patient: Leobardo Ramos. MRN: [id number]  SSN: [id number]    YOB: 1995  Age: 21 y.o. Sex: male      Date of Service: 1/13/2020   Provider: Cheron Cowden, PA   Office Location:   [address] [place], [address]  Office Phone: [phone]  Office Fax: [phone]        REASON FOR VISIT:   Chief Complaint   Patient presents with    Physical        VITALS:   Visit Vitals  /76 (BP 1 Location: Left arm, BP Patient Position: Sitting)   Pulse 75   Temp 98.7 °F (37.1 °C) (Oral)   Resp 20   Ht 5' 6\" (1.676 m)   Wt 208 lb (94.3 kg)   SpO2 98%   BMI 33.57 kg/m²       MEDICATIONS:   Current Outpatient Medications on File Prior to Visit   Medication Sig Dispense Refill    ibuprofen (MOTRIN) 600 mg tablet Take 1 Tab by mouth every six (6) hours as needed for Pain. 20 Tab 0    ondansetron (ZOFRAN ODT) 4 mg disintegrating tablet Take 1-2 tablets every 6-8 hours as needed for nausea and vomiting. 10 Tab 0    acetaminophen (TYLENOL) 325 mg tablet Take 2 Tabs by mouth every four (4) hours as needed for Pain. 20 Tab 0    traZODone (DESYREL) 100 mg tablet Take 1 Tab by mouth nightly. 30 Tab 0     No current facility-administered medications on file prior to visit. ALLERGIES:   No Known Allergies     PAST MEDICAL HISTORY:  Past Medical History:   Diagnosis Date    Lead poisoning     Prediabetes         SURGICAL HISTORY:  History reviewed. No pertinent surgical history.      FAMILY HISTORY:  Family History   Problem Relation Age of Onset    Diabetes Mother     Heart Failure Mother     Hypertension Mother     COPD Father     Diabetes Brother         SOCIAL HISTORY:  Social History     Socioeconomic History    Marital status: SINGLE     Spouse name: Not on file    Number of children: Not on file    Years of education: Not on file    Highest education level: Not on file   Tobacco Use    Smoking status: Never Smoker    Smokeless tobacco: Never Used   Substance and Sexual Activity    Alcohol use: No    Drug use: No    Sexual activity: Never        HEALTH MAINTENANCE:  Health Maintenance   Topic Date Due    Influenza Age 5 to Adult  08/01/2019    DTaP/Tdap/Td series (8 - Td) 06/10/2022    Pneumococcal 0-64 years  Aged Out           HPI:  Kendall Juarez. is a 21 y.o. male who presents to the office for a routine health maintenance exam. 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 X-rays were ordered in August 2018 and were WNL. Patient was referred to physical therapy, but never went. He is open-minded to revisiting this today. Prediabetes/Dyslipidemia -   A1c of 5.8% noted on recent routine labs. LDL Cholesterol also elevated. Patient has a strong family history of diabetes and heart disease. States he has already started working on making some dietary changes and has been exercising more, though he admits he has a \"sweet tooth. \" Review of diet is as follows:     breakfast - cereal, apple  lunch - usually skips  dinner - usually well balanced with a meat (chicken or fish) plus a starch and a vegetable       Depressed Mood -   Positive PHQ noted on intake as follows:   Patient states it is not so much that he wants to be dead or has thoughts of suicide/self harm, it is more so that he sometimes has intrusive thoughts about dying accidentally. He feels that a lot of it is related to stress at his job, and within his family.      3 most recent PHQ Screens 1/13/2020   PHQ Not Done -   Little interest or pleasure in doing things More than half the days   Feeling down, depressed, irritable, or hopeless More than half the days   Total Score PHQ 2 4   Trouble falling or staying asleep, or sleeping too much Nearly every day   Feeling tired or having little energy More than half the days   Poor appetite, weight loss, or overeating More than half the days   Feeling bad about yourself - or that you are a failure or have let yourself or your family down More than half the days   Trouble concentrating on things such as school, work, reading, or watching TV Several days   Moving or speaking so slowly that other people could have noticed; or the opposite being so fidgety that others notice Not at all   Thoughts of being better off dead, or hurting yourself in some way More than half the days   PHQ 9 Score 16   How difficult have these problems made it for you to do your work, take care of your home and get along with others Somewhat difficult        REVIEW OF SYSTEMS:   Review of Systems   Constitutional: Negative for chills, fever and malaise/fatigue. Eyes: Negative for blurred vision and double vision. Respiratory: Negative for cough, shortness of breath and wheezing. Cardiovascular: Negative for chest pain, palpitations and leg swelling. Gastrointestinal: Negative for abdominal pain, constipation, diarrhea, nausea and vomiting. Musculoskeletal: Positive for back pain. Psychiatric/Behavioral: Positive for depression. Negative for suicidal ideas. The patient is not nervous/anxious. PHYSICAL EXAMINATION:   Physical Exam  HENT:      Head: Normocephalic and atraumatic. Right Ear: Tympanic membrane and external ear normal.      Left Ear: Tympanic membrane and external ear normal.      Nose: Nose normal.      Mouth/Throat:      Pharynx: Uvula midline. Eyes:      General:         Right eye: No discharge. Left eye: No discharge. Conjunctiva/sclera: Conjunctivae normal.      Pupils: Pupils are equal, round, and reactive to light. Neck:      Musculoskeletal: Neck supple. Cardiovascular:      Rate and Rhythm: Normal rate and regular rhythm. Heart sounds: Normal heart sounds. No murmur. No friction rub. No gallop.     Pulmonary:      Effort: Pulmonary effort is normal.      Breath sounds: Normal breath sounds. No stridor. No wheezing or rales. Abdominal:      General: Bowel sounds are normal. There is no distension. Palpations: Abdomen is soft. There is no mass. Tenderness: There is no tenderness. There is no guarding or rebound. Musculoskeletal:         General: No deformity. Lymphadenopathy:      Cervical: No cervical adenopathy. Skin:     General: Skin is warm and dry. Findings: No rash. Neurological:      Mental Status: He is alert and oriented to person, place, and time. Gait: Gait is intact. Psychiatric:         Mood and Affect: Mood and affect normal.         Cognition and Memory: Memory normal.          ASSESSMENT/PLAN:  Diagnoses and all orders for this visit:    1. Routine physical examination  - Normal physical exam findings as detailed above  - Health screenings reviewed   - Age and gender specific counseling provided      2. Positive depression screening  3. Depressed mood  - Patient denies any intent to harm himself or others today  - We discussed trial of medications vs. therapy, and patient would like to start with talk therapy. I do feel he would benefit from this. Referral placed to [facility]  Orders:    -     REFERRAL TO PSYCHOLOGY    4. Prediabetes  5. Dyslipidemia  - A large portion of today's visit was spent counseling on diet and exercise  - Will plan to repeat labs in about 6 months  Orders:    -     METABOLIC PANEL, COMPREHENSIVE; Future  -     HEMOGLOBIN A1C W/O EAG; Future    6. Chronic bilateral low back pain without sciatica  - Referred to PT  Orders:    -     REFERRAL TO PHYSICAL THERAPY    Follow-up and Dispositions    · Return in about 6 months (around 7/13/2020) for routine care and fasting labs to be done 3-5 days prior .              JOYA Zavala  1/13/2020   2:44 PM

## 2020-01-13 NOTE — PATIENT INSTRUCTIONS

## 2020-07-01 DIAGNOSIS — E78.5 DYSLIPIDEMIA: ICD-10-CM

## 2020-07-01 DIAGNOSIS — R73.03 PREDIABETES: ICD-10-CM

## 2020-07-22 ENCOUNTER — HOSPITAL ENCOUNTER (OUTPATIENT)
Dept: LAB | Age: 25
Discharge: HOME | End: 2020-07-22

## 2020-07-22 LAB — SENTARA SPECIMEN COL,SENBCF: NORMAL

## 2020-07-22 PROCEDURE — 99001 SPECIMEN HANDLING PT-LAB: CPT

## 2020-07-23 LAB
A-G RATIO,AGRAT: 1.4 RATIO (ref 1.1–2.6)
ALBUMIN SERPL-MCNC: 4.6 G/DL (ref 3.5–5)
ALP SERPL-CCNC: 48 U/L (ref 25–115)
ALT SERPL-CCNC: 23 U/L (ref 5–40)
ANION GAP SERPL CALC-SCNC: 13 MMOL/L (ref 3–15)
AST SERPL W P-5'-P-CCNC: 17 U/L (ref 10–37)
AVG GLU, 10930: 114 MG/DL (ref 91–123)
BILIRUB SERPL-MCNC: 0.5 MG/DL (ref 0.2–1.2)
BUN SERPL-MCNC: 15 MG/DL (ref 6–22)
CALCIUM SERPL-MCNC: 9.8 MG/DL (ref 8.4–10.5)
CHLORIDE SERPL-SCNC: 101 MMOL/L (ref 98–110)
CHOLEST SERPL-MCNC: 268 MG/DL (ref 110–200)
CO2 SERPL-SCNC: 24 MMOL/L (ref 20–32)
CREAT SERPL-MCNC: 1.2 MG/DL (ref 0.5–1.2)
GFRAA, 66117: >60
GFRNA, 66118: >60
GLOBULIN,GLOB: 3.3 G/DL (ref 2–4)
GLUCOSE SERPL-MCNC: 86 MG/DL (ref 70–99)
HBA1C MFR BLD HPLC: 5.6 % (ref 4.8–5.6)
HDLC SERPL-MCNC: 48 MG/DL
HDLC SERPL-MCNC: 5.6 MG/DL (ref 0–5)
LDL/HDL RATIO,LDHD: 4.1
LDLC SERPL CALC-MCNC: 196 MG/DL (ref 50–99)
NON-HDL CHOLESTEROL, 011976: 220 MG/DL
POTASSIUM SERPL-SCNC: 4.3 MMOL/L (ref 3.5–5.5)
PROT SERPL-MCNC: 7.9 G/DL (ref 6.4–8.3)
SODIUM SERPL-SCNC: 138 MMOL/L (ref 133–145)
TRIGL SERPL-MCNC: 120 MG/DL (ref 40–149)
VLDLC SERPL CALC-MCNC: 24 MG/DL (ref 8–30)

## 2020-07-30 ENCOUNTER — TELEPHONE (OUTPATIENT)
Dept: FAMILY MEDICINE CLINIC | Age: 25
End: 2020-07-30

## 2020-07-30 ENCOUNTER — OFFICE VISIT (OUTPATIENT)
Dept: FAMILY MEDICINE CLINIC | Age: 25
End: 2020-07-30

## 2020-07-30 VITALS
TEMPERATURE: 98.9 F | OXYGEN SATURATION: 96 % | DIASTOLIC BLOOD PRESSURE: 78 MMHG | SYSTOLIC BLOOD PRESSURE: 128 MMHG | HEIGHT: 66 IN | RESPIRATION RATE: 16 BRPM | BODY MASS INDEX: 37.45 KG/M2 | WEIGHT: 233 LBS | HEART RATE: 74 BPM

## 2020-07-30 DIAGNOSIS — E66.9 OBESITY, UNSPECIFIED CLASSIFICATION, UNSPECIFIED OBESITY TYPE, UNSPECIFIED WHETHER SERIOUS COMORBIDITY PRESENT: ICD-10-CM

## 2020-07-30 DIAGNOSIS — R73.03 PREDIABETES: Primary | ICD-10-CM

## 2020-07-30 DIAGNOSIS — R73.03 PREDIABETES: ICD-10-CM

## 2020-07-30 DIAGNOSIS — E78.5 DYSLIPIDEMIA: ICD-10-CM

## 2020-07-30 DIAGNOSIS — Z13.31 POSITIVE DEPRESSION SCREENING: ICD-10-CM

## 2020-07-30 DIAGNOSIS — R45.89 DEPRESSED MOOD: ICD-10-CM

## 2020-07-30 RX ORDER — BUPROPION HYDROCHLORIDE 150 MG/1
150 TABLET ORAL
Qty: 30 TAB | Refills: 1 | Status: SHIPPED | OUTPATIENT
Start: 2020-07-30 | End: 2020-10-06 | Stop reason: SDUPTHER

## 2020-07-30 NOTE — PROGRESS NOTES
1. Have you been to the ER, urgent care clinic since your last visit? Hospitalized since your last visit? No    2. Have you seen or consulted any other health care providers outside of the [address] [place] [address] since your last visit? Include any pap smears or colon screening.  No    3 most recent PHQ Screens 7/30/2020   PHQ Not Done -   Little interest or pleasure in doing things Several days   Feeling down, depressed, irritable, or hopeless Nearly every day   Total Score PHQ 2 4   Trouble falling or staying asleep, or sleeping too much Several days   Feeling tired or having little energy Several days   Poor appetite, weight loss, or overeating Several days   Feeling bad about yourself - or that you are a failure or have let yourself or your family down Several days   Trouble concentrating on things such as school, work, reading, or watching TV Several days   Moving or speaking so slowly that other people could have noticed; or the opposite being so fidgety that others notice Not at all   Thoughts of being better off dead, or hurting yourself in some way Several days   PHQ 9 Score 10   How difficult have these problems made it for you to do your work, take care of your home and get along with others -

## 2020-07-31 NOTE — PROGRESS NOTES
SUBJECTIVE  Chief Complaint   Patient presents with    Depression    Results    Cholesterol Problem     Patient presents for follow-up to check up on his prediabetes. Labs are up to date. We have reviewed the most recent labs. He has had some weight gain during covid but prior to that was doing great with working out and eating better. He is nervous about his strong family history of diabetes. We also reviewed their cardiac risk factors. He has very high cholesterol and trying to make changes. He has had issues with depression but he says more with anxiety over the years. He has no harmful ideations he says. He says he has had stressors like his job and gets down when he is not working out. He says that is his outlet. 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 ROS:  History obtained from the patient  · General: negative for - chills, fever  · Respiratory: no cough, shortness of breath, or wheezing  · Cardiovascular: no chest pain, palpitations, or dyspnea on exertion  · Gastrointestinal: no abdominal pain, N/V, change in bowel habits, or black or bloody stools  · Neurological: no numbness, tingling, headache or dizziness    OBJECTIVE  Blood pressure 128/78, pulse 74, temperature 98.9 °F (37.2 °C), temperature source Oral, resp. rate 16, height 5' 6\" (1.676 m), weight 233 lb (105.7 kg), SpO2 96 %. General:  alert, cooperative, well appearing, in no apparent distress. CV:  The heart sounds are regular in rate and rhythm. There is a normal S1 and S2. There or no murmurs. Lungs: Inspiratory and expiratory efforts are full and unlabored. Lung sounds are clear and equal to auscultation throughout all lung fields without wheezing, rales, or rhonchi. Psych: normal affect. Mood good. Oriented x 3. Judgement and insight intact. Results for Adonis Slater (MRN [id number]) as of 7/31/2020 15:36   Ref.  Range 7/22/2020 16:05   Sodium Latest Ref Range: 133 - 145 mmol/L 138   Potassium Latest Ref Range: 3.5 - 5.5 mmol/L 4.3   Chloride Latest Ref Range: 98 - 110 mmol/L 101   CO2 Latest Ref Range: 20 - 32 mmol/L 24   Anion gap Latest Ref Range: 3.0 - 15.0 mmol/L 13.0   Glucose Latest Ref Range: 70 - 99 mg/dL 86   BUN Latest Ref Range: 6 - 22 mg/dL 15   Creatinine Latest Ref Range: 0.5 - 1.2 mg/dL 1.2   Calcium Latest Ref Range: 8.4 - 10.5 mg/dL 9.8   Bilirubin, total Latest Ref Range: 0.2 - 1.2 mg/dL 0.5   Protein, total Latest Ref Range: 6.4 - 8.3 g/dL 7.9   Albumin Latest Ref Range: 3.5 - 5.0 g/dL 4.6   Globulin Latest Ref Range: 2.0 - 4.0 g/dL 3.3   A-G Ratio Latest Ref Range: 1.1 - 2.6 ratio 1.4   ALT Latest Ref Range: 5 - 40 U/L 23   AST Latest Ref Range: 10 - 37 U/L 17   Alk. phosphatase Latest Ref Range: 25 - 115 U/L 48   Triglyceride Latest Ref Range: 40 - 149 mg/dL 120   Cholesterol, total Latest Ref Range: 110 - 200 mg/dL 268 (H)   HDL Cholesterol Latest Ref Range: >=40 mg/dL 48   CHOLESTEROL/HDL Latest Ref Range: 0.0 - 5.0  5.6   Non-HDL Cholesterol Latest Ref Range: <130 mg/dL 220 (H)   VLDL, calculated Latest Ref Range: 8 - 30 mg/dL 24   LDL, calculated Latest Ref Range: 50 - 99 mg/dL 196 (H)   LDL/HDL Ratio Unknown 4.1   Hemoglobin A1c, (calculated) Latest Ref Range: 4.8 - 5.6 % 5.6     ASSESSMENT / PLAN    ICD-10-CM ICD-9-CM    1. Prediabetes  R73.03 790.29 HEMOGLOBIN A1C W/O EAG   2. Dyslipidemia  E78.5 272.4 LIPID PANEL   3. Positive depression screening  Z13.31 796.4    4. Depressed mood  R45.89 799.29    5. Obesity, unspecified classification, unspecified obesity type, unspecified whether serious comorbidity present  E66.9 278.00      Prediabetes- diet and exercise. Reviewed A1c. Dyslipidemia - cholesterol handout provided. May want to start statin based on family history. Depressed mood - start a trial of Wellbutrin XL 150mg daily. He does not have any history of seizures or binge drinking.       Obesity - diet and exercise has been effective in the past.  Keep encouraging. All chart history elements were reviewed by me at the time of the visit even though marked at time of note closure. Patient understands our medical plan. Patient has provided input and agrees with goals. Alternatives have been explained and offered. All questions answered. The patient is to call if condition worsens or fails to improve. Follow-up and Dispositions    · Return in about 6 weeks (around 9/10/2020) for depression.

## 2020-07-31 NOTE — PATIENT INSTRUCTIONS
A Healthy Lifestyle: Care Instructions Your Care Instructions A healthy lifestyle can help you feel good, stay at a healthy weight, and have plenty of energy for both work and play. A healthy lifestyle is something you can share with your whole family. A healthy lifestyle also can lower your risk for serious health problems, such as high blood pressure, heart disease, and diabetes. You can follow a few steps listed below to improve your health and the health of your family. Follow-up care is a key part of your treatment and safety. Be sure to make and go to all appointments, and call your doctor if you are having problems. It's also a good idea to know your test results and keep a list of the medicines you take. How can you care for yourself at home? · Do not eat too much sugar, fat, or fast foods. You can still have dessert and treats now and then. The goal is moderation. · Start small to improve your eating habits. Pay attention to portion sizes, drink less juice and soda pop, and eat more fruits and vegetables. ? Eat a healthy amount of food. A 3-ounce serving of meat, for example, is about the size of a deck of cards. Fill the rest of your plate with vegetables and whole grains. ? Limit the amount of soda and sports drinks you have every day. Drink more water when you are thirsty. ? Eat at least 5 servings of fruits and vegetables every day. It may seem like a lot, but it is not hard to reach this goal. A serving or helping is 1 piece of fruit, 1 cup of vegetables, or 2 cups of leafy, raw vegetables. Have an apple or some carrot sticks as an afternoon snack instead of a candy bar. Try to have fruits and/or vegetables at every meal. 
· Make exercise part of your daily routine. You may want to start with simple activities, such as walking, bicycling, or slow swimming. Try to be active 30 to 60 minutes every day.  You do not need to do all 30 to 60 minutes all at once. For example, you can exercise 3 times a day for 10 or 20 minutes. Moderate exercise is safe for most people, but it is always a good idea to talk to your doctor before starting an exercise program. 
· Keep moving. Aura Damon the lawn, work in the garden, or [employer]. Take the stairs instead of the elevator at work. · If you smoke, quit. People who smoke have an increased risk for heart attack, stroke, cancer, and other lung illnesses. Quitting is hard, but there are ways to boost your chance of quitting tobacco for good. ? Use nicotine gum, patches, or lozenges. ? Ask your doctor about stop-smoking programs and medicines. ? Keep trying. In addition to reducing your risk of diseases in the future, you will notice some benefits soon after you stop using tobacco. If you have shortness of breath or asthma symptoms, they will likely get better within a few weeks after you quit. · Limit how much alcohol you drink. Moderate amounts of alcohol (up to 2 drinks a day for men, 1 drink a day for women) are okay. But drinking too much can lead to liver problems, high blood pressure, and other health problems. Family health If you have a family, there are many things you can do together to improve your health. · Eat meals together as a family as often as possible. · Eat healthy foods. This includes fruits, vegetables, lean meats and dairy, and whole grains. · Include your family in your fitness plan. Most people think of activities such as jogging or tennis as the way to fitness, but there are many ways you and your family can be more active. Anything that makes you breathe hard and gets your heart pumping is exercise. Here are some tips: 
? Walk to do errands or to take your child to school or the bus. 
? Go for a family bike ride after dinner instead of watching TV. Where can you learn more? Go to http://honey-adrien.info/ Enter T784 in the search box to learn more about \"A Healthy Lifestyle: Care Instructions. \" Current as of: January 31, 2020               Content Version: 12.5 © 2006-2020 Healthwise, Inc[employer]. Care instructions adapted under license by [employer] (which disclaims liability or warranty for this information). If you have questions about a medical condition or this instruction, always ask your healthcare professional. [place] [address] any warranty or liability for your use of this information.

## 2020-10-06 ENCOUNTER — OFFICE VISIT (OUTPATIENT)
Dept: FAMILY MEDICINE CLINIC | Age: 25
End: 2020-10-06

## 2020-10-06 ENCOUNTER — TELEPHONE (OUTPATIENT)
Dept: FAMILY MEDICINE CLINIC | Age: 25
End: 2020-10-06

## 2020-10-06 VITALS
TEMPERATURE: 98.1 F | HEART RATE: 63 BPM | DIASTOLIC BLOOD PRESSURE: 86 MMHG | HEIGHT: 66 IN | WEIGHT: 233 LBS | BODY MASS INDEX: 37.45 KG/M2 | SYSTOLIC BLOOD PRESSURE: 120 MMHG | RESPIRATION RATE: 16 BRPM | OXYGEN SATURATION: 95 %

## 2020-10-06 DIAGNOSIS — E78.5 DYSLIPIDEMIA: ICD-10-CM

## 2020-10-06 DIAGNOSIS — R73.03 PREDIABETES: ICD-10-CM

## 2020-10-06 DIAGNOSIS — F33.0 DEPRESSION, MAJOR, RECURRENT, MILD (HCC): Primary | ICD-10-CM

## 2020-10-06 DIAGNOSIS — Z23 ENCOUNTER FOR IMMUNIZATION: ICD-10-CM

## 2020-10-06 PROCEDURE — 99214 OFFICE O/P EST MOD 30 MIN: CPT | Performed by: FAMILY MEDICINE

## 2020-10-06 PROCEDURE — 90686 IIV4 VACC NO PRSV 0.5 ML IM: CPT | Performed by: FAMILY MEDICINE

## 2020-10-06 PROCEDURE — 90471 IMMUNIZATION ADMIN: CPT | Performed by: FAMILY MEDICINE

## 2020-10-06 RX ORDER — BUPROPION HYDROCHLORIDE 150 MG/1
150 TABLET ORAL
Qty: 30 TAB | Refills: 5 | Status: SHIPPED | OUTPATIENT
Start: 2020-10-06 | End: 2021-04-06 | Stop reason: DRUGHIGH

## 2020-10-06 NOTE — PATIENT INSTRUCTIONS
A Healthy Lifestyle: Care Instructions Your Care Instructions A healthy lifestyle can help you feel good, stay at a healthy weight, and have plenty of energy for both work and play. A healthy lifestyle is something you can share with your whole family. A healthy lifestyle also can lower your risk for serious health problems, such as high blood pressure, heart disease, and diabetes. You can follow a few steps listed below to improve your health and the health of your family. Follow-up care is a key part of your treatment and safety. Be sure to make and go to all appointments, and call your doctor if you are having problems. It's also a good idea to know your test results and keep a list of the medicines you take. How can you care for yourself at home? · Do not eat too much sugar, fat, or fast foods. You can still have dessert and treats now and then. The goal is moderation. · Start small to improve your eating habits. Pay attention to portion sizes, drink less juice and soda pop, and eat more fruits and vegetables. ? Eat a healthy amount of food. A 3-ounce serving of meat, for example, is about the size of a deck of cards. Fill the rest of your plate with vegetables and whole grains. ? Limit the amount of soda and sports drinks you have every day. Drink more water when you are thirsty. ? Eat at least 5 servings of fruits and vegetables every day. It may seem like a lot, but it is not hard to reach this goal. A serving or helping is 1 piece of fruit, 1 cup of vegetables, or 2 cups of leafy, raw vegetables. Have an apple or some carrot sticks as an afternoon snack instead of a candy bar. Try to have fruits and/or vegetables at every meal. 
· Make exercise part of your daily routine. You may want to start with simple activities, such as walking, bicycling, or slow swimming. Try to be active 30 to 60 minutes every day.  You do not need to do all 30 to 60 minutes all at once. For example, you can exercise 3 times a day for 10 or 20 minutes. Moderate exercise is safe for most people, but it is always a good idea to talk to your doctor before starting an exercise program. 
· Keep moving. Patricia Held the lawn, work in the garden, or [employer]. Take the stairs instead of the elevator at work. · If you smoke, quit. People who smoke have an increased risk for heart attack, stroke, cancer, and other lung illnesses. Quitting is hard, but there are ways to boost your chance of quitting tobacco for good. ? Use nicotine gum, patches, or lozenges. ? Ask your doctor about stop-smoking programs and medicines. ? Keep trying. In addition to reducing your risk of diseases in the future, you will notice some benefits soon after you stop using tobacco. If you have shortness of breath or asthma symptoms, they will likely get better within a few weeks after you quit. · Limit how much alcohol you drink. Moderate amounts of alcohol (up to 2 drinks a day for men, 1 drink a day for women) are okay. But drinking too much can lead to liver problems, high blood pressure, and other health problems. Family health If you have a family, there are many things you can do together to improve your health. · Eat meals together as a family as often as possible. · Eat healthy foods. This includes fruits, vegetables, lean meats and dairy, and whole grains. · Include your family in your fitness plan. Most people think of activities such as jogging or tennis as the way to fitness, but there are many ways you and your family can be more active. Anything that makes you breathe hard and gets your heart pumping is exercise. Here are some tips: 
? Walk to do errands or to take your child to school or the bus. 
? Go for a family bike ride after dinner instead of watching TV. Where can you learn more? Go to http://www.Gorsh.[employer]/ Enter N165 in the search box to learn more about \"A Healthy Lifestyle: Care Instructions. \" Current as of: January 31, 2020               Content Version: 12.6 © 2006-2020 [employer]. Care instructions adapted under license by [employer] (which disclaims liability or warranty for this information). If you have questions about a medical condition or this instruction, always ask your healthcare professional. [address] any warranty or liability for your use of this information. Learning About the [address] Diet What is the Mediterranean diet? The Mediterranean diet is a style of eating rather than a diet plan. It features foods eaten in [place], [place], [place] and [place], and other countries along the [facility]. It emphasizes eating foods like fish, fruits, vegetables, beans, high-fiber breads and whole grains, nuts, and olive oil. This style of eating includes limited red meat, cheese, and sweets. Why choose the Mediterranean diet? A Mediterranean-style diet may improve heart health. It contains more fat than other heart-healthy diets. But the fats are mainly from nuts, unsaturated oils (such as fish oils and olive oil), and certain nut or seed oils (such as canola, soybean, or flaxseed oil). These fats may help protect the heart and blood vessels. How can you get started on the Mediterranean diet? Here are some things you can do to switch to a more Mediterranean way of eating. What to eat · Eat a variety of fruits and vegetables each day, such as grapes, blueberries, tomatoes, broccoli, peppers, figs, olives, spinach, eggplant, beans, lentils, and chickpeas. · Eat a variety of whole-grain foods each day, such as oats, brown rice, and whole wheat bread, pasta, and couscous. · Eat fish at least 2 times a week. Try tuna, salmon, mackerel, lake trout, herring, or sardines. · Eat moderate amounts of low-fat dairy products, such as milk, cheese, or yogurt. · Eat moderate amounts of poultry and eggs. · Choose healthy (unsaturated) fats, such as nuts, olive oil, and certain nut or seed oils like canola, soybean, and flaxseed. · Limit unhealthy (saturated) fats, such as butter, palm oil, and coconut oil. And limit fats found in animal products, such as meat and dairy products made with whole milk. Try to eat red meat only a few times a month in very small amounts. · Limit sweets and desserts to only a few times a week. This includes sugar-sweetened drinks like soda. The Mediterranean diet may also include red wine with your meal1 glass each day for women and up to 2 glasses a day for men. Tips for eating at home · Use herbs, spices, garlic, lemon zest, and citrus juice instead of salt to add flavor to foods. · Add avocado slices to your sandwich instead of drake. · Have fish for lunch or dinner instead of red meat. Brush the fish with olive oil, and broil or grill it. · Sprinkle your salad with seeds or nuts instead of cheese. · Cook with olive or canola oil instead of butter or oils that are high in saturated fat. · Switch from 2% milk or whole milk to 1% or fat-free milk. · Dip raw vegetables in a vinaigrette dressing or hummus instead of dips made from mayonnaise or sour cream. 
· Have a piece of fruit for dessert instead of a piece of cake. Try baked apples, or have some dried fruit. Tips for eating out · Try broiled, grilled, baked, or poached fish instead of having it fried or breaded. · Ask your  to have your meals prepared with olive oil instead of butter. · Order dishes made with marinara sauce or sauces made from olive oil. Avoid sauces made from cream or mayonnaise. · Choose whole-grain breads, whole wheat pasta and pizza crust, brown rice, beans, and lentils. · Cut back on butter or margarine on bread.  Instead, you can dip your bread in a small amount of olive oil. · Ask for a side salad or grilled vegetables instead of french fries or chips. Where can you learn more? Go to http://www.gray.com/ Enter 641-978-3870 in the search box to learn more about \"Learning About the Mediterranean Diet. \" Current as of: August 22, 2019               Content Version: 12.6 © 5771-8141 [employer] Care instructions adapted under license by [employer] (which disclaims liability or warranty for this information). If you have questions about a medical condition or this instruction, always ask your healthcare professional. [place] [address] any warranty or liability for your use of this information. Influenza (Flu) Vaccine (Inactivated or Recombinant): What You Need to Know Why get vaccinated? Influenza vaccine can prevent influenza (flu). Flu is a contagious disease that spreads around the [place] every year, usually between October and May. Anyone can get the flu, but it is more dangerous for some people. Infants and young children, people 72years of age and older, pregnant women, and people with certain health conditions or a weakened immune system are at greatest risk of flu complications. Pneumonia, bronchitis, sinus infections and ear infections are examples of flu-related complications. If you have a medical condition, such as heart disease, cancer or diabetes, flu can make it worse. Flu can cause fever and chills, sore throat, muscle aches, fatigue, cough, headache, and runny or stuffy nose. Some people may have vomiting and diarrhea, though this is more common in children than adults. Each year, thousands of people in the [facility] die from flu, and many more are hospitalized. Flu vaccine prevents millions of illnesses and flu-related visits to the doctor each year. Influenza vaccine CDC recommends everyone 10months of age and older get vaccinated every flu season. Children 6 months through 6years of age may need 2 doses during a single flu season. Everyone else needs only 1 dose each flu season. It takes about 2 weeks for protection to develop after vaccination. There are many flu viruses, and they are always changing. Each year a new flu vaccine is made to protect against three or four viruses that are likely to cause disease in the upcoming flu season. Even when the vaccine doesn't exactly match these viruses, it may still provide some protection. Influenza vaccine does not cause flu. Influenza vaccine may be given at the same time as other vaccines. Talk with your health care provider Tell your vaccine provider if the person getting the vaccine: 
· Has had an allergic reaction after a previous dose of influenza vaccine, or has any severe, life-threatening allergies. · Has ever had Guillain-Barré Syndrome (also called GBS). In some cases, your health care provider may decide to postpone influenza vaccination to a future visit. People with minor illnesses, such as a cold, may be vaccinated. People who are moderately or severely ill should usually wait until they recover before getting influenza vaccine. Your health care provider can give you more information. Risks of a vaccine reaction · Soreness, redness, and swelling where shot is given, fever, muscle aches, and headache can happen after influenza vaccine. · There may be a very small increased risk of Guillain-Barré Syndrome (GBS) after inactivated influenza vaccine (the flu shot). [facility] children who get the flu shot along with pneumococcal vaccine (PCV13), and/or DTaP vaccine at the same time might be slightly more likely to have a seizure caused by fever. Tell your health care provider if a child who is getting flu vaccine has ever had a seizure. People sometimes faint after medical procedures, including vaccination.  Tell your provider if you feel dizzy or have vision changes or ringing in the ears. As with any medicine, there is a very remote chance of a vaccine causing a severe allergic reaction, other serious injury, or death. What if there is a serious problem? An allergic reaction could occur after the vaccinated person leaves the clinic. If you see signs of a severe allergic reaction (hives, swelling of the face and throat, difficulty breathing, a fast heartbeat, dizziness, or weakness), call 9-1-1 and get the person to the nearest hospital. 
For other signs that concern you, call your health care provider. Adverse reactions should be reported to the Vaccine Adverse Event Reporting System (VAERS). Your health care provider will usually file this report, or you can do it yourself. Visit the VAERS website at www.vaers. hhs.gov or call 5-246.670.3922. VAERS is only for reporting reactions, and VAERS staff do not give medical advice. The National Vaccine Injury Compensation Program 
The National Vaccine Injury Compensation Program (VICP) is a federal program that was created to compensate people who may have been injured by certain vaccines. Visit the VICP website at www.hrsa.gov/vaccinecompensation or call 4-878.642.5029 to learn about the program and about filing a claim. There is a time limit to file a claim for compensation. How can I learn more? · Ask your healthcare provider. · Call your local or state health department. · Contact the Centers for Disease Control and Prevention (CDC): 
? Call 4-424.430.8365 (1-800-CDC-INFO) or 
? Visit CDC's website at www.cdc.gov/flu Vaccine Information Statement (Interim) Inactivated Influenza Vaccine 8/15/2019 
42 AVTAR Slater [id number] Department of [facility] and [place] Centers for Disease Control and Prevention Many Vaccine Information Statements are available in English and other languages. See www.immunize.org/vis. Muchas hojas de información sobre vacunas están disponibles en español y en otros idiomas. Visite www.immunize.org/vis. Care instructions adapted under license by [employer] (which disclaims liability or warranty for this information). If you have questions about a medical condition or this instruction, always ask your healthcare professional. [address] any warranty or liability for your use of this information.

## 2020-10-06 NOTE — TELEPHONE ENCOUNTER
Have you been diagnosed with, tested for, or told that you are suspected of having COVID-19 (coronovirus)? Have you had a fever or taken medication to treat a fever in the past 72 hours? Have you had a cough, SOB, or flu-like symptoms within the past 3 days? Have you had direct contact with someone who tested positive for COVID-19 within the past 14 days? Do you have a household member with flu-like symptoms, including fever, cough, or SOB? Do you currently have flu-like symptoms including fever, cough, or SOB? Are you experiencing new loss of taste or smell?           ALL ANSWERS TO THE ABOVE QUESTIONS IS NO

## 2020-10-06 NOTE — PROGRESS NOTES
Physician order obtained. Patient completed adult immunization consent form. Allergies, contraindications and recommendations reviewed with patient. Seasonal influenza vaccine administered IM left deltoid. Patient tolerated well. Patient remained in office for 15 minutes after injection and no adverse reactions were noted.     Lot # [id number]  Exp Date: June 30,2021  [facility] # [id number]

## 2020-10-06 NOTE — PROGRESS NOTES
SUBJECTIVE  Chief Complaint   Patient presents with    Depression     f/u     Patient presents for follow-up of depression. Says that the meds are working great. No harmful ideations. Has more energy. No side effects. ROS:  History obtained from the patient  · General: negative for - chills, fever  · Respiratory: no cough, shortness of breath, or wheezing  · Cardiovascular: no chest pain or dyspnea on exertion  · Neurological: no numbness, tingling, headache or dizziness    OBJECTIVE  Blood pressure 120/86, pulse 63, temperature 98.1 °F (36.7 °C), temperature source Oral, resp. rate 16, height 5' 6\" (1.676 m), weight 233 lb (105.7 kg), SpO2 95 %. General:  alert, cooperative, well appearing, in no apparent distress. CV:  The heart sounds are regular in rate and rhythm. There is a normal S1 and S2. There or no murmurs. Lungs: Inspiratory and expiratory efforts are full and unlabored. Lung sounds are clear and equal to auscultation throughout all lung fields without wheezing, rales, or rhonchi. Psych: normal affect. Mood good. Oriented x 3. Judgement and insight intact. ASSESSMENT / PLAN    ICD-10-CM ICD-9-CM    1. Depression, major, recurrent, mild (HCC)  F33.0 296.31 buPROPion XL (WELLBUTRIN XL) 150 mg tablet   2. Prediabetes  R73.03 790.29 HEMOGLOBIN A1C WITH EAG   3. Dyslipidemia  E78.5 272.4 LIPID PANEL   4. Encounter for immunization  Z23 V03.89 INFLUENZA VIRUS VAC QUAD,SPLIT,PRESV FREE SYRINGE IM      [place] IMMUNIZ ADMIN,1 SINGLE/COMB VAC/TOXOID     Depression- Cont Wellbutrin XL 150mg daily. Prediabetes- diet and exercise. A1c prior to next OV. Dyslipidemia - check lipids with next set of labs. Flu vaccine administered. All chart history elements were reviewed by me at the time of the visit even though marked at time of note closure. Patient understands our medical plan. Patient has provided input and agrees with goals. Alternatives have been explained and offered.   All questions answered. The patient is to call if condition worsens or fails to improve. Follow-up and Dispositions    · Return in about 6 months (around 4/6/2021) for routine care and fasting labs to be done 3-5 days prior.

## 2021-04-02 ENCOUNTER — HOSPITAL ENCOUNTER (OUTPATIENT)
Dept: LAB | Age: 26
Discharge: HOME | End: 2021-04-02

## 2021-04-02 LAB — SENTARA SPECIMEN COL,SENBCF: NORMAL

## 2021-04-02 PROCEDURE — 99001 SPECIMEN HANDLING PT-LAB: CPT

## 2021-04-03 LAB
AVG GLU, 10930: 115 MG/DL (ref 91–123)
CHOLEST SERPL-MCNC: 255 MG/DL (ref 110–200)
HBA1C MFR BLD HPLC: 5.6 % (ref 4.8–5.6)
HDLC SERPL-MCNC: 46 MG/DL
HDLC SERPL-MCNC: 5.5 MG/DL (ref 0–5)
LDL/HDL RATIO,LDHD: 3.8
LDLC SERPL CALC-MCNC: 176 MG/DL (ref 50–99)
NON-HDL CHOLESTEROL, 011976: 209 MG/DL
TRIGL SERPL-MCNC: 163 MG/DL (ref 40–149)
VLDLC SERPL CALC-MCNC: 33 MG/DL (ref 8–30)

## 2021-04-06 ENCOUNTER — OFFICE VISIT (OUTPATIENT)
Dept: FAMILY MEDICINE CLINIC | Age: 26
End: 2021-04-06
Payer: MEDICAID

## 2021-04-06 VITALS
SYSTOLIC BLOOD PRESSURE: 122 MMHG | OXYGEN SATURATION: 95 % | TEMPERATURE: 97.7 F | HEART RATE: 64 BPM | BODY MASS INDEX: 38.89 KG/M2 | RESPIRATION RATE: 16 BRPM | DIASTOLIC BLOOD PRESSURE: 82 MMHG | WEIGHT: 242 LBS | HEIGHT: 66 IN

## 2021-04-06 DIAGNOSIS — R73.03 PREDIABETES: ICD-10-CM

## 2021-04-06 DIAGNOSIS — E66.9 OBESITY, UNSPECIFIED CLASSIFICATION, UNSPECIFIED OBESITY TYPE, UNSPECIFIED WHETHER SERIOUS COMORBIDITY PRESENT: ICD-10-CM

## 2021-04-06 DIAGNOSIS — E78.5 DYSLIPIDEMIA: ICD-10-CM

## 2021-04-06 DIAGNOSIS — F33.0 DEPRESSION, MAJOR, RECURRENT, MILD (HCC): Primary | ICD-10-CM

## 2021-04-06 PROCEDURE — 99214 OFFICE O/P EST MOD 30 MIN: CPT | Performed by: FAMILY MEDICINE

## 2021-04-06 RX ORDER — BUPROPION HYDROCHLORIDE 300 MG/1
300 TABLET ORAL
Qty: 30 TAB | Refills: 1 | Status: SHIPPED | OUTPATIENT
Start: 2021-04-06 | End: 2021-06-08 | Stop reason: SDUPTHER

## 2021-04-06 NOTE — PROGRESS NOTES
1. Have you been to the ER, urgent care clinic since your last visit? Hospitalized since your last visit? No    2. Have you seen or consulted any other health care providers outside of the [address] [place] [address] since your last visit? Include any pap smears or colon screening.  No    3 most recent PHQ Screens 4/6/2021   PHQ Not Done -   Little interest or pleasure in doing things Several days   Feeling down, depressed, irritable, or hopeless Several days   Total Score PHQ 2 2   Trouble falling or staying asleep, or sleeping too much Several days   Feeling tired or having little energy Several days   Poor appetite, weight loss, or overeating Not at all   Feeling bad about yourself - or that you are a failure or have let yourself or your family down Several days   Trouble concentrating on things such as school, work, reading, or watching TV Several days   Moving or speaking so slowly that other people could have noticed; or the opposite being so fidgety that others notice Nearly every day   Thoughts of being better off dead, or hurting yourself in some way Several days   PHQ 9 Score 10   How difficult have these problems made it for you to do your work, take care of your home and get along with others Somewhat difficult

## 2021-04-06 NOTE — PROGRESS NOTES
SUBJECTIVE  Chief Complaint   Patient presents with    Results     lab review     Patient presents for follow-up to check up on his prediabetes. Labs are up to date. We also reviewed their cardiac risk factors. He has very high cholesterol and trying to make changes. He has had issues with depression that have not resolved on Wellbutrin 150mg daily. He has no harmful ideations he says. OBJECTIVE  Blood pressure 122/82, pulse 64, temperature 97.7 °F (36.5 °C), temperature source Temporal, resp. rate 16, height 5' 6\" (1.676 m), weight 242 lb (109.8 kg), SpO2 95 %. General:  alert, cooperative, well appearing, in no apparent distress. CV:  The heart sounds are regular in rate and rhythm. There is a normal S1 and S2. There or no murmurs. Lungs: Inspiratory and expiratory efforts are full and unlabored. Lung sounds are clear and equal to auscultation throughout all lung fields without wheezing, rales, or rhonchi. Psych: normal affect. Mood good. Oriented x 3. Judgement and insight intact. Results for orders placed or performed in visit on 04/02/21   LIPID PANEL   Result Value Ref Range    Triglyceride 163 (H) 40 - 149 mg/dL    HDL Cholesterol 46 >=40 mg/dL    Cholesterol, total 255 (H) 110 - 200 mg/dL    CHOLESTEROL/HDL 5.5 0.0 - 5.0    Non-HDL Cholesterol 209 (H) <130 mg/dL    LDL, calculated 176 (H) 50 - 99 mg/dL    VLDL, calculated 33 (H) 8 - 30 mg/dL    LDL/HDL Ratio 3.8    HEMOGLOBIN A1C W/O EAG   Result Value Ref Range    Hemoglobin A1c 5.6 4.8 - 5.6 %    AVG  91 - 123 mg/dL       ASSESSMENT / PLAN    ICD-10-CM ICD-9-CM    1. Depression, major, recurrent, mild (HCC)  F33.0 296.31 buPROPion XL (WELLBUTRIN XL) 300 mg XL tablet   2. Prediabetes  R73.03 790.29    3. Dyslipidemia  E78.5 272.4    4. Obesity, unspecified classification, unspecified obesity type, unspecified whether serious comorbidity present  E66.9 278.00      Depressed mood - increase to Wellbutrin XL 300mg daily.   RTC in 6 weeks to gauge progress. Prediabetes- diet and exercise. Reviewed A1c which is now normal.  It has been so for the past two checks. Dyslipidemia - cholesterol handout provided at prior visit. He is reluctant to start a statin. Obesity - diet and exercise has been effective in the past.  Keep encouraging. All chart history elements were reviewed by me at the time of the visit even though marked at time of note closure. Patient understands our medical plan. Patient has provided input and agrees with goals. Alternatives have been explained and offered. All questions answered. The patient is to call if condition worsens or fails to improve. Follow-up and Dispositions    · Return in about 6 weeks (around 5/18/2021) for depression.

## 2021-04-06 NOTE — PATIENT INSTRUCTIONS
A Healthy Lifestyle: Care Instructions Your Care Instructions A healthy lifestyle can help you feel good, stay at a healthy weight, and have plenty of energy for both work and play. A healthy lifestyle is something you can share with your whole family. A healthy lifestyle also can lower your risk for serious health problems, such as high blood pressure, heart disease, and diabetes. You can follow a few steps listed below to improve your health and the health of your family. Follow-up care is a key part of your treatment and safety. Be sure to make and go to all appointments, and call your doctor if you are having problems. It's also a good idea to know your test results and keep a list of the medicines you take. How can you care for yourself at home? · Do not eat too much sugar, fat, or fast foods. You can still have dessert and treats now and then. The goal is moderation. · Start small to improve your eating habits. Pay attention to portion sizes, drink less juice and soda pop, and eat more fruits and vegetables. ? Eat a healthy amount of food. A 3-ounce serving of meat, for example, is about the size of a deck of cards. Fill the rest of your plate with vegetables and whole grains. ? Limit the amount of soda and sports drinks you have every day. Drink more water when you are thirsty. ? Eat plenty of fruits and vegetables every day. Have an apple or some carrot sticks as an afternoon snack instead of a candy bar. Try to have fruits and/or vegetables at every meal. 
· Make exercise part of your daily routine. You may want to start with simple activities, such as walking, bicycling, or slow swimming. Try to be active 30 to 60 minutes every day. You do not need to do all 30 to 60 minutes all at once. For example, you can exercise 3 times a day for 10 or 20 minutes.  Moderate exercise is safe for most people, but it is always a good idea to talk to your doctor before starting an exercise program. 
· Keep moving. [employer] the lawn, work in the garden, or [employer]. Take the stairs instead of the elevator at work. · If you smoke, quit. People who smoke have an increased risk for heart attack, stroke, cancer, and other lung illnesses. Quitting is hard, but there are ways to boost your chance of quitting tobacco for good. ? Use nicotine gum, patches, or lozenges. ? Ask your doctor about stop-smoking programs and medicines. ? Keep trying. In addition to reducing your risk of diseases in the future, you will notice some benefits soon after you stop using tobacco. If you have shortness of breath or asthma symptoms, they will likely get better within a few weeks after you quit. · Limit how much alcohol you drink. Moderate amounts of alcohol (up to 2 drinks a day for men, 1 drink a day for women) are okay. But drinking too much can lead to liver problems, high blood pressure, and other health problems. Family health If you have a family, there are many things you can do together to improve your health. · Eat meals together as a family as often as possible. · Eat healthy foods. This includes fruits, vegetables, lean meats and dairy, and whole grains. · Include your family in your fitness plan. Most people think of activities such as jogging or tennis as the way to fitness, but there are many ways you and your family can be more active. Anything that makes you breathe hard and gets your heart pumping is exercise. Here are some tips: 
? Walk to do errands or to take your child to school or the bus. 
? Go for a family bike ride after dinner instead of watching TV. Where can you learn more? Go to http://www.gray.com/ Enter H463 in the search box to learn more about \"A Healthy Lifestyle: Care Instructions. \" Current as of: September 23, 2020               Content Version: 12.8 © 4271-4378 Healthwise, Incorporated.   
Care instructions adapted under license by [employer] (which disclaims liability or warranty for this information). If you have questions about a medical condition or this instruction, always ask your healthcare professional. [address] any warranty or liability for your use of this information.

## 2021-06-08 ENCOUNTER — OFFICE VISIT (OUTPATIENT)
Dept: FAMILY MEDICINE CLINIC | Age: 26
End: 2021-06-08
Payer: MEDICAID

## 2021-06-08 VITALS
HEART RATE: 82 BPM | OXYGEN SATURATION: 98 % | TEMPERATURE: 98.3 F | BODY MASS INDEX: 38.09 KG/M2 | SYSTOLIC BLOOD PRESSURE: 130 MMHG | RESPIRATION RATE: 20 BRPM | WEIGHT: 237 LBS | HEIGHT: 66 IN | DIASTOLIC BLOOD PRESSURE: 80 MMHG

## 2021-06-08 DIAGNOSIS — R73.03 PREDIABETES: ICD-10-CM

## 2021-06-08 DIAGNOSIS — E66.9 OBESITY, UNSPECIFIED CLASSIFICATION, UNSPECIFIED OBESITY TYPE, UNSPECIFIED WHETHER SERIOUS COMORBIDITY PRESENT: ICD-10-CM

## 2021-06-08 DIAGNOSIS — F33.0 DEPRESSION, MAJOR, RECURRENT, MILD (HCC): Primary | ICD-10-CM

## 2021-06-08 DIAGNOSIS — E78.5 DYSLIPIDEMIA: ICD-10-CM

## 2021-06-08 DIAGNOSIS — M54.50 LOW BACK PAIN, UNSPECIFIED BACK PAIN LATERALITY, UNSPECIFIED CHRONICITY, UNSPECIFIED WHETHER SCIATICA PRESENT: ICD-10-CM

## 2021-06-08 PROCEDURE — 99214 OFFICE O/P EST MOD 30 MIN: CPT | Performed by: FAMILY MEDICINE

## 2021-06-08 RX ORDER — BUPROPION HYDROCHLORIDE 300 MG/1
300 TABLET ORAL
Qty: 30 TABLET | Refills: 5 | Status: SHIPPED | OUTPATIENT
Start: 2021-06-08 | End: 2022-04-04 | Stop reason: SDUPTHER

## 2021-06-08 RX ORDER — ATORVASTATIN CALCIUM 20 MG/1
20 TABLET, FILM COATED ORAL
Qty: 30 TABLET | Refills: 5 | Status: SHIPPED | OUTPATIENT
Start: 2021-06-08 | End: 2022-01-05

## 2021-06-08 NOTE — PATIENT INSTRUCTIONS
Prediabetes: Care Instructions Overview Prediabetes is a warning sign that you're at risk for getting type 2 diabetes. It means that your blood sugar is higher than it should be. But it's not high enough to be diabetes. The food you eat naturally turns into sugar. Your body uses the sugar for energy. Normally, an organ called the pancreas makes insulin. And insulin allows the sugar in your blood to get into your body's cells. But sometimes the body can't use insulin the right way. So the sugar stays in your blood instead. This is called insulin resistance. The buildup of sugar in your blood means you have prediabetes. The good news is that you may be able to prevent or delay diabetes. Making small lifestyle changes, like getting active and changing your eating habits, may help you get your blood sugar back to normal. You can work with your doctor to make a treatment plan. Follow-up care is a key part of your treatment and safety. Be sure to make and go to all appointments, and call your doctor if you are having problems. It's also a good idea to know your test results and keep a list of the medicines you take. How can you care for yourself at home? · Watch your weight. A healthy weight helps your body use insulin properly. · Limit the amount of calories, sweets, and unhealthy fat you eat. Ask your doctor if you should see a dietitian. A registered dietitian can help you create meal plans that fit your lifestyle. · Get at least 30 minutes of exercise on most days of the week. Exercise helps control your blood sugar. It also helps you maintain a healthy weight. Walking is a good choice. You also may want to do other activities, such as running, swimming, cycling, or playing tennis or team sports. · Do not smoke. Smoking can make prediabetes worse. If you need help quitting, talk to your doctor about stop-smoking programs and medicines. These can increase your chances of quitting for good.  
· If your doctor prescribed medicines, take them exactly as prescribed. Call your doctor if you think you are having a problem with your medicine. You will get more details on the specific medicines your doctor prescribes. When should you call for help? Watch closely for changes in your health, and be sure to contact your doctor if: 
  · You have any symptoms of diabetes. These may include: 
? Being thirsty more often. ? Urinating more. ? Being hungrier. ? Losing weight. ? Being very tired. ? Having blurry vision.  
  · You have a wound that will not heal.  
  · You have an infection that will not go away.  
  · You have problems with your blood pressure.  
  · You want more information about diabetes and how you can keep from getting it. Where can you learn more? Go to http://www.gray.com/ Enter I222 in the search box to learn more about \"Prediabetes: Care Instructions. \" Current as of: August 31, 2020               Content Version: 12.8 © 2006-2021 Healthwise, Incorporated. Care instructions adapted under license by [employer] (which disclaims liability or warranty for this information). If you have questions about a medical condition or this instruction, always ask your healthcare professional. [address] any warranty or liability for your use of this information.

## 2021-06-08 NOTE — PROGRESS NOTES
Chief Complaint   Patient presents with    Depression    Results     review of results      1. Have you been to the ER, urgent care clinic since your last visit? Hospitalized since your last visit? No    2. Have you seen or consulted any other health care providers outside of the [address] [place] [address] since your last visit? Include any pap smears or colon screening.  No

## 2021-06-08 NOTE — PROGRESS NOTES
SUBJECTIVE  Chief Complaint   Patient presents with    Depression    Results     review of results      Patient presents for follow-up to check up on his depression. Notes about 40% improvement. He has no harmful ideations he says. Has a history of prediabetes. Labs are up to date. Latest A1c is normal.  We also reviewed their cardiac risk factors. He has very high cholesterol and despite trying to make changes, it is still elevated. Low back pain nonradiating with certain exercises like deadlifts. No injuries. Has not tried anything. OBJECTIVE  Blood pressure 130/80, pulse 82, temperature 98.3 °F (36.8 °C), temperature source Temporal, resp. rate 20, height 5' 6\" (1.676 m), weight 237 lb (107.5 kg), SpO2 98 %. General:  alert, cooperative, well appearing, in no apparent distress. CV:  The heart sounds are regular in rate and rhythm. There is a normal S1 and S2. There or no murmurs. Lungs: Inspiratory and expiratory efforts are full and unlabored. Lung sounds are clear and equal to auscultation throughout all lung fields without wheezing, rales, or rhonchi. Back:  Full ROM. Painfree ROM. Nontender. Neuro:  No LE deficits. Gait is normal.    Psych: normal affect. Mood good. Oriented x 3. Judgement and insight intact. Results for orders placed or performed in visit on 04/02/21   LIPID PANEL   Result Value Ref Range    Triglyceride 163 (H) 40 - 149 mg/dL    HDL Cholesterol 46 >=40 mg/dL    Cholesterol, total 255 (H) 110 - 200 mg/dL    CHOLESTEROL/HDL 5.5 0.0 - 5.0    Non-HDL Cholesterol 209 (H) <130 mg/dL    LDL, calculated 176 (H) 50 - 99 mg/dL    VLDL, calculated 33 (H) 8 - 30 mg/dL    LDL/HDL Ratio 3.8    HEMOGLOBIN A1C W/O EAG   Result Value Ref Range    Hemoglobin A1c 5.6 4.8 - 5.6 %    AVG  91 - 123 mg/dL     ASSESSMENT / PLAN    ICD-10-CM ICD-9-CM    1.  Depression, major, recurrent, mild (HCC)  F33.0 296.31 buPROPion XL (WELLBUTRIN XL) 300 mg XL tablet REFERRAL TO PSYCHOLOGY   2. Prediabetes  R73.03 790.29 HEMOGLOBIN A1C WITH EAG   3. Dyslipidemia  E78.5 272.4 atorvastatin (LIPITOR) 20 mg tablet      HEPATIC FUNCTION PANEL      LIPID PANEL   4. Obesity, unspecified classification, unspecified obesity type, unspecified whether serious comorbidity present  E66.9 278.00    5. Low back pain, unspecified back pain laterality, unspecified chronicity, unspecified whether sciatica present  M54.5 724.2      Depression - cont Wellbutrin XL 300mg daily. We will add in counseling. Referral placed. Prediabetes-  Cont iet and exercise. Reviewed A1c which is normal.       Dyslipidemia - start lipitor 20mg nightly. Check LFTs and lipids in 6 months. Obesity - diet and exercise. Keep encouraging. Lumbago - activity modifications, heat / stretch, core strengthening. All chart history elements were reviewed by me at the time of the visit even though marked at time of note closure. Patient understands our medical plan. Patient has provided input and agrees with goals. Alternatives have been explained and offered. All questions answered. The patient is to call if condition worsens or fails to improve. Follow-up and Dispositions    · Return in about 6 months (around 12/8/2021) for Depression and fasting labs to be done 3-5 days prior .

## 2022-01-05 DIAGNOSIS — E78.5 DYSLIPIDEMIA: ICD-10-CM

## 2022-01-05 RX ORDER — ATORVASTATIN CALCIUM 20 MG/1
20 TABLET, FILM COATED ORAL
Qty: 30 TABLET | Refills: 0 | Status: SHIPPED | OUTPATIENT
Start: 2022-01-05 | End: 2022-04-04 | Stop reason: SDUPTHER

## 2022-01-06 NOTE — TELEPHONE ENCOUNTER
Brennen Alberts [phone] for patient advising him that he is due for fasting labs and follow-up. We can refill this for 30 days.

## 2022-03-19 PROBLEM — E78.5 DYSLIPIDEMIA: Status: ACTIVE | Noted: 2018-08-30

## 2022-03-19 PROBLEM — F51.01 PRIMARY INSOMNIA: Status: ACTIVE | Noted: 2019-07-17

## 2022-03-20 PROBLEM — F33.0 DEPRESSION, MAJOR, RECURRENT, MILD (HCC): Status: ACTIVE | Noted: 2020-10-06

## 2022-04-04 ENCOUNTER — OFFICE VISIT (OUTPATIENT)
Dept: FAMILY MEDICINE CLINIC | Age: 27
End: 2022-04-04
Payer: MEDICAID

## 2022-04-04 ENCOUNTER — HOSPITAL ENCOUNTER (OUTPATIENT)
Dept: GENERAL RADIOLOGY | Age: 27
Discharge: HOME | End: 2022-04-04
Payer: MEDICAID

## 2022-04-04 VITALS
RESPIRATION RATE: 18 BRPM | TEMPERATURE: 97.3 F | HEIGHT: 66 IN | WEIGHT: 223.38 LBS | BODY MASS INDEX: 35.9 KG/M2 | OXYGEN SATURATION: 98 % | SYSTOLIC BLOOD PRESSURE: 126 MMHG | DIASTOLIC BLOOD PRESSURE: 78 MMHG | HEART RATE: 67 BPM

## 2022-04-04 DIAGNOSIS — M54.16 LUMBAR RADICULITIS: Primary | ICD-10-CM

## 2022-04-04 DIAGNOSIS — M54.16 LUMBAR RADICULITIS: ICD-10-CM

## 2022-04-04 DIAGNOSIS — F33.0 DEPRESSION, MAJOR, RECURRENT, MILD (HCC): ICD-10-CM

## 2022-04-04 DIAGNOSIS — E66.9 OBESITY, UNSPECIFIED CLASSIFICATION, UNSPECIFIED OBESITY TYPE, UNSPECIFIED WHETHER SERIOUS COMORBIDITY PRESENT: ICD-10-CM

## 2022-04-04 DIAGNOSIS — R73.03 PREDIABETES: ICD-10-CM

## 2022-04-04 DIAGNOSIS — E78.5 DYSLIPIDEMIA: ICD-10-CM

## 2022-04-04 PROCEDURE — 99214 OFFICE O/P EST MOD 30 MIN: CPT | Performed by: FAMILY MEDICINE

## 2022-04-04 PROCEDURE — 72110 X-RAY EXAM L-2 SPINE 4/>VWS: CPT

## 2022-04-04 RX ORDER — ATORVASTATIN CALCIUM 20 MG/1
20 TABLET, FILM COATED ORAL
Qty: 30 TABLET | Refills: 1 | Status: SHIPPED | OUTPATIENT
Start: 2022-04-04 | End: 2022-10-04 | Stop reason: SDUPTHER

## 2022-04-04 RX ORDER — BUPROPION HYDROCHLORIDE 300 MG/1
300 TABLET ORAL
Qty: 30 TABLET | Refills: 11 | Status: SHIPPED | OUTPATIENT
Start: 2022-04-04 | End: 2022-05-27

## 2022-04-04 NOTE — PROGRESS NOTES
Chief Complaint   Patient presents with    Other     Pre Diabetes    Cholesterol Problem    Depression    Leg Pain     c/o right leg pain      1. \"Have you been to the ER, urgent care clinic since your last visit? Hospitalized since your last visit? \" No    2. \"Have you seen or consulted any other health care providers outside of the [facility] since your last visit? \" No     3. For patients aged 39-70: Has the patient had a colonoscopy / FIT/ Cologuard?  NA - based on age       1 most recent PHQ Screens 4/4/2022   PHQ Not Done -   Little interest or pleasure in doing things More than half the days   Feeling down, depressed, irritable, or hopeless More than half the days   Total Score PHQ 2 4   Trouble falling or staying asleep, or sleeping too much More than half the days   Feeling tired or having little energy Not at all   Poor appetite, weight loss, or overeating Not at all   Feeling bad about yourself - or that you are a failure or have let yourself or your family down More than half the days   Trouble concentrating on things such as school, work, reading, or watching TV Several days   Moving or speaking so slowly that other people could have noticed; or the opposite being so fidgety that others notice Not at all   Thoughts of being better off dead, or hurting yourself in some way More than half the days   PHQ 9 Score 11   How difficult have these problems made it for you to do your work, take care of your home and get along with others Very difficult

## 2022-04-04 NOTE — PROGRESS NOTES
SUBJECTIVE  Chief Complaint   Patient presents with    Other     Pre Diabetes    Cholesterol Problem    Depression    Leg Pain     c/o right leg pain      Patient presents for a few issues. Mainly he is concerned about an injury when he was doing exercise when he twisted and felt a pop on the right side around his low back por hip. It is hard for him to tell he says. Since then he has had a limp and some radicular pains down his leg. He says it was weak but is better now. He was seeing us in 2021 but did not follow through with his follow-up for treatment of high cholesterol and depression. Was due to see is in Dec.      He is taking Wellbutrin and says it helps for depression but that he does not take it every day. He says he takes it about 3-4 times per week. He has no harmful ideations. OBJECTIVE  Blood pressure 126/78, pulse 67, temperature 97.3 °F (36.3 °C), temperature source Temporal, resp. rate 18, height 5' 6\" (1.676 m), weight 223 lb 6 oz (101.3 kg), SpO2 98 %. General:  alert, cooperative, well appearing, in no apparent distress. CV:  The heart sounds are regular in rate and rhythm. There is a normal S1 and S2. There or no murmurs. Lungs: Inspiratory and expiratory efforts are full and unlabored. Lung sounds are clear and equal to auscultation throughout all lung fields without wheezing, rales, or rhonchi. Back:  Full ROM. Painfree ROM. Nontender. Positive SLR on the right. Neuro:  No LE deficits. Gait is normal.    Psych: normal affect. Mood good. Oriented x 3. Judgement and insight intact. ASSESSMENT / PLAN    ICD-10-CM ICD-9-CM    1. Lumbar radiculitis  M54.16 724.4 XR SPINE LUMB MIN 4 V   2. Dyslipidemia  E78.5 272.4 atorvastatin (LIPITOR) 20 mg tablet      LIPID PANEL      HEPATIC FUNCTION PANEL   3. Prediabetes  R73.03 790.29 HEMOGLOBIN A1C W/O EAG   4.  Depression, major, recurrent, mild (HCC)  F33.0 296.31 buPROPion XL (WELLBUTRIN XL) 300 mg XL tablet   5. Obesity, unspecified classification, unspecified obesity type, unspecified whether serious comorbidity present  E66.9 278.00      Lumbar radiculitis - x-ray of lumbar spine with obliques. Dyslipidemia - start back on lipitor 20mg nightly. Check LFTs and lipids in 2 months. Prediabetes-  Cont iet and exercise. Reviewed A1c which is normal.       Depression - encourage daily Wellbutrin XL 300mg instead of missing doses. I have advised on counseling in the past but he has not followed through. Obesity - diet and exercise. Keep encouraging. All chart history elements were reviewed by me at the time of the visit even though marked at time of note closure. Patient understands our medical plan. Patient has provided input and agrees with goals. Alternatives have been explained and offered. All questions answered. The patient is to call if condition worsens or fails to improve. Follow-up and Dispositions    · Return in about 2 months (around 6/4/2022) for routine care, x-ray today, and fasting labs 3-5 days prior .

## 2022-04-04 NOTE — PATIENT INSTRUCTIONS
Learning About How to Have a Healthy Back  What causes back pain? Back pain is often caused by overuse, strain, or injury. For example, people often hurt their backs playing sports or working in the yard, being jolted in a car accident, or lifting something too heavy. Aging plays a part too. Your bones and muscles tend to lose strength as you age, which makes injury more likely. The spongy discs between the bones of the spine (vertebrae) may suffer from wear and tear and no longer provide enough cushion between the bones. A disc that bulges or breaks open (herniated disc) can press on nerves, causing back pain. In some people, back pain is the result of arthritis, broken vertebrae caused by bone loss (osteoporosis), illness, or a spine problem. Although most people have back pain at one time or another, there are steps you can take to make it less likely. How can you have a healthy back? Reduce stress on your back through good posture   Slumping or slouching alone may not cause low back pain. But after the back has been strained or injured, bad posture can make pain worse. · Sleep in a position that maintains your back's normal curves and on a mattress that feels comfortable. Sleep on your side with a pillow between your knees, or sleep on your back with a pillow under your knees. These positions can reduce strain on your back. · Stand and sit up straight. \"Good posture\" generally means your ears, shoulders, and hips are in a straight line. · If you must stand for a long time, put one foot on a stool, ledge, or box. Switch feet every now and then. · Sit in a chair that is low enough to let you place both feet flat on the floor with both knees nearly level with your hips. If your chair or desk is too high, use a footrest to raise your knees. Place a small pillow, a rolled-up towel, or a lumbar roll in the curve of your back if you need extra support.   · Try a kneeling chair, which helps tilt your hips forward. This takes pressure off your lower back. · Try sitting on an exercise ball. It can rock from side to side, which helps keep your back loose. · When driving, keep your knees nearly level with your hips. Sit straight, and drive with both hands on the steering wheel. Your arms should be in a slightly bent position. Reduce stress on your back through careful lifting   · Squat down, bending at the hips and knees only. If you need to, put one knee to the floor and extend your other knee in front of you, bent at a right angle (half kneeling). · Press your chest straight forward. This helps keep your upper back straight while keeping a slight arch in your low back. · Hold the load as close to your body as possible, at the level of your belly button (navel). · Use your feet to change direction, taking small steps. · Lead with your hips as you change direction. Keep your shoulders in line with your hips as you move. · Set down your load carefully, squatting with your knees and hips only. Exercise and stretch your back   · Do some exercise on most days of the week, if your doctor says it is okay. You can walk, run, swim, or cycle. · Stretch your back muscles. Here are a few exercises to try:  ? Lie on your back, and gently pull one bent knee to your chest. Put that foot back on the floor, and then pull the other knee to your chest.  ? Do pelvic tilts. Lie on your back with your knees bent. Tighten your stomach muscles. Pull your belly button (navel) in and up toward your ribs. You should feel like your back is pressing to the floor and your hips and pelvis are slightly lifting off the floor. Hold for 6 seconds while breathing smoothly. ? Sit with your back flat against a wall. · Keep your core muscles strong. The muscles of your back, belly (abdomen), and buttocks support your spine. ? Pull in your belly and imagine pulling your navel toward your spine. Hold this for 6 seconds, then relax.  Remember to keep breathing normally as you tense your muscles. ? Do curl-ups. Always do them with your knees bent. Keep your low back on the floor, and curl your shoulders toward your knees using a smooth, slow motion. Keep your arms folded across your chest. If this bothers your neck, try putting your hands behind your neck (not your head), with your elbows spread apart. ? Lie on your back with your knees bent and your feet flat on the floor. Tighten your belly muscles, and then push with your feet and raise your buttocks up a few inches. Hold this position 6 seconds as you continue to breathe normally, then lower yourself slowly to the floor. Repeat 8 to 12 times. ? If you like group exercise, try Pilates or yoga. These classes have poses that strengthen the core muscles. Lead a healthy lifestyle   · Stay at a healthy weight to avoid strain on your back. · Do not smoke. Smoking increases the risk of osteoporosis, which weakens the spine. If you need help quitting, talk to your doctor about stop-smoking programs and medicines. These can increase your chances of quitting for good. Where can you learn more? Go to http://www.[employer].com/  Enter L315 in the search box to learn more about \"Learning About How to Have a Healthy Back. \"  Current as of: July 1, 2021               Content Version: 13.2  © 1255-8363 [employer]. Care instructions adapted under license by [employer] (which disclaims liability or warranty for this information). If you have questions about a medical condition or this instruction, always ask your healthcare professional. [place] [address] any warranty or liability for your use of this information.

## 2022-05-16 ENCOUNTER — APPOINTMENT (OUTPATIENT)
Dept: GENERAL RADIOLOGY | Age: 27
End: 2022-05-16
Attending: NURSE PRACTITIONER
Payer: MEDICAID

## 2022-05-16 ENCOUNTER — HOSPITAL ENCOUNTER (EMERGENCY)
Age: 27
Discharge: HOME | End: 2022-05-16
Attending: EMERGENCY MEDICINE
Payer: MEDICAID

## 2022-05-16 VITALS
DIASTOLIC BLOOD PRESSURE: 81 MMHG | HEART RATE: 69 BPM | RESPIRATION RATE: 18 BRPM | OXYGEN SATURATION: 99 % | SYSTOLIC BLOOD PRESSURE: 133 MMHG | TEMPERATURE: 97.8 F

## 2022-05-16 DIAGNOSIS — R55 SYNCOPE AND COLLAPSE: ICD-10-CM

## 2022-05-16 DIAGNOSIS — F41.1 ANXIETY REACTION: Primary | ICD-10-CM

## 2022-05-16 LAB
ALBUMIN SERPL-MCNC: 4.4 G/DL (ref 3.4–5)
ALBUMIN/GLOB SERPL: 1.1 {RATIO} (ref 0.8–1.7)
ALP SERPL-CCNC: 59 U/L (ref 45–117)
ALT SERPL-CCNC: 38 U/L (ref 16–61)
ANION GAP SERPL CALC-SCNC: 8 MMOL/L (ref 3–18)
AST SERPL-CCNC: 29 U/L (ref 10–38)
ATRIAL RATE: 71 BPM
BASOPHILS # BLD: 0 K/UL (ref 0–0.1)
BASOPHILS NFR BLD: 0 % (ref 0–2)
BILIRUB SERPL-MCNC: 0.7 MG/DL (ref 0.2–1)
BUN SERPL-MCNC: 12 MG/DL (ref 7–18)
BUN/CREAT SERPL: 10 (ref 12–20)
CALCIUM SERPL-MCNC: 10 MG/DL (ref 8.5–10.1)
CALCULATED P AXIS, ECG09: 50 DEGREES
CALCULATED R AXIS, ECG10: 52 DEGREES
CALCULATED T AXIS, ECG11: 46 DEGREES
CHLORIDE SERPL-SCNC: 107 MMOL/L (ref 100–111)
CO2 SERPL-SCNC: 25 MMOL/L (ref 21–32)
CREAT SERPL-MCNC: 1.16 MG/DL (ref 0.6–1.3)
DIAGNOSIS, 93000: NORMAL
DIFFERENTIAL METHOD BLD: ABNORMAL
EOSINOPHIL # BLD: 0.1 K/UL (ref 0–0.4)
EOSINOPHIL NFR BLD: 2 % (ref 0–5)
ERYTHROCYTE [DISTWIDTH] IN BLOOD BY AUTOMATED COUNT: 13.8 % (ref 11.6–14.5)
GLOBULIN SER CALC-MCNC: 4 G/DL (ref 2–4)
GLUCOSE SERPL-MCNC: 91 MG/DL (ref 74–99)
HCT VFR BLD AUTO: 46.2 % (ref 36–48)
HGB BLD-MCNC: 15 G/DL (ref 13–16)
IMM GRANULOCYTES # BLD AUTO: 0 K/UL (ref 0–0.04)
IMM GRANULOCYTES NFR BLD AUTO: 0 % (ref 0–0.5)
LYMPHOCYTES # BLD: 1.5 K/UL (ref 0.9–3.6)
LYMPHOCYTES NFR BLD: 32 % (ref 21–52)
MAGNESIUM SERPL-MCNC: 2.3 MG/DL (ref 1.6–2.6)
MCH RBC QN AUTO: 25.6 PG (ref 24–34)
MCHC RBC AUTO-ENTMCNC: 32.5 G/DL (ref 31–37)
MCV RBC AUTO: 78.8 FL (ref 78–100)
MONOCYTES # BLD: 0.4 K/UL (ref 0.05–1.2)
MONOCYTES NFR BLD: 8 % (ref 3–10)
NEUTS SEG # BLD: 2.8 K/UL (ref 1.8–8)
NEUTS SEG NFR BLD: 58 % (ref 40–73)
NRBC # BLD: 0 K/UL (ref 0–0.01)
NRBC BLD-RTO: 0 PER 100 WBC
P-R INTERVAL, ECG05: 172 MS
PLATELET # BLD AUTO: 334 K/UL (ref 135–420)
PMV BLD AUTO: 9 FL (ref 9.2–11.8)
POTASSIUM SERPL-SCNC: 4.2 MMOL/L (ref 3.5–5.5)
PROT SERPL-MCNC: 8.4 G/DL (ref 6.4–8.2)
Q-T INTERVAL, ECG07: 376 MS
QRS DURATION, ECG06: 98 MS
QTC CALCULATION (BEZET), ECG08: 408 MS
RBC # BLD AUTO: 5.86 M/UL (ref 4.35–5.65)
SODIUM SERPL-SCNC: 140 MMOL/L (ref 136–145)
TROPONIN-HIGH SENSITIVITY: 10 NG/L (ref 0–78)
VENTRICULAR RATE, ECG03: 71 BPM
WBC # BLD AUTO: 4.8 K/UL (ref 4.6–13.2)

## 2022-05-16 PROCEDURE — 84484 ASSAY OF TROPONIN QUANT: CPT

## 2022-05-16 PROCEDURE — 71046 X-RAY EXAM CHEST 2 VIEWS: CPT

## 2022-05-16 PROCEDURE — 93005 ELECTROCARDIOGRAM TRACING: CPT

## 2022-05-16 PROCEDURE — 80053 COMPREHEN METABOLIC PANEL: CPT

## 2022-05-16 PROCEDURE — 83735 ASSAY OF MAGNESIUM: CPT

## 2022-05-16 PROCEDURE — 99285 EMERGENCY DEPT VISIT HI MDM: CPT

## 2022-05-16 PROCEDURE — 85025 COMPLETE CBC W/AUTO DIFF WBC: CPT

## 2022-05-16 NOTE — ED PROVIDER NOTES
EMERGENCY DEPARTMENT HISTORY AND PHYSICAL EXAM    5:17 PM      Date: 5/16/2022  Patient Name: Jana Masters. History of Presenting Illness     No chief complaint on file. History Provided By: Patient    Additional History (Context): Jana Masters. is a 31 y.o. male with past medical history significant for anxiety, depression, prediabetes, and obesity who presents with complaints of a near syncopal episode that occurred while he was at work today. Patient states he was standing or reading something on his phone when he found out some disturbing news he started hyperventilating and states he was having what he knows is a panic attack. States he got sweaty, had tingling in his fingers, and felt like he was going to pass out. A coworker witnessed this and help him sit down on a bench. He did lose consciousness briefly for a few seconds per this bystander. Patient states he had chest pain at that time but it is since resolved. He is on Wellbutrin for anxiety and depression. PCP: Paul Soriano MD    Current Outpatient Medications   Medication Sig Dispense Refill    atorvastatin (LIPITOR) 20 mg tablet Take 1 Tablet by mouth nightly. 30 Tablet 1    buPROPion XL (WELLBUTRIN XL) 300 mg XL tablet Take 1 Tablet by mouth every morning. 30 Tablet 11       Past History     Past Medical History:  Past Medical History:   Diagnosis Date    Hypercholesterolemia     Lead poisoning childhood    Medically noncompliant     2021 - lost to follow-up, did not continue agreed upon careplan with treatment of high chol and depression     Obesity     Prediabetes        Past Surgical History:  No past surgical history on file.     Family History:  Family History   Problem Relation Age of Onset    Diabetes Mother     Heart Failure Mother     Hypertension Mother     COPD Father     Diabetes Brother        Social History:  Social History     Tobacco Use    Smoking status: Never Smoker    Smokeless tobacco: Never Used   Substance Use Topics    Alcohol use: No    Drug use: No       Allergies:  No Known Allergies      Review of Systems       Review of Systems   Constitutional: Negative. Negative for chills and fever. HENT: Negative. Negative for congestion, ear pain and rhinorrhea. Eyes: Negative. Negative for pain and redness. Respiratory: Negative. Negative for cough and shortness of breath. Cardiovascular: Positive for chest pain. Negative for palpitations and leg swelling. Gastrointestinal: Negative. Negative for abdominal pain, constipation, diarrhea, nausea and vomiting. Genitourinary: Negative. Negative for dysuria, frequency, hematuria and urgency. Musculoskeletal: Negative. Negative for back pain, gait problem, joint swelling and neck pain. Skin: Negative. Negative for rash and wound. Neurological: Positive for syncope. Negative for dizziness, seizures, speech difficulty, weakness, light-headedness and headaches. Hematological: Negative for adenopathy. Does not bruise/bleed easily. All other systems reviewed and are negative. Physical Exam     Visit Vitals  /81 (BP 1 Location: Left arm, BP Patient Position: Sitting)   Pulse 69   Temp 97.8 °F (36.6 °C)   Resp 18   SpO2 99%         Physical Exam  Vitals and nursing note reviewed. Constitutional:       General: He is not in acute distress. Appearance: Normal appearance. He is obese. He is not ill-appearing, toxic-appearing or diaphoretic. HENT:      Head: Normocephalic and atraumatic. Nose: Nose normal. No congestion or rhinorrhea. Mouth/Throat:      Mouth: Mucous membranes are moist.      Pharynx: Oropharynx is clear. No oropharyngeal exudate or posterior oropharyngeal erythema. Eyes:      General: Vision grossly intact. Gaze aligned appropriately. No scleral icterus. Right eye: No discharge. Left eye: No discharge.       Conjunctiva/sclera: Conjunctivae normal. Cardiovascular:      Rate and Rhythm: Normal rate and regular rhythm. Pulses: Normal pulses. Heart sounds: Normal heart sounds. No murmur heard. No friction rub. No gallop. Pulmonary:      Effort: Pulmonary effort is normal. No respiratory distress. Breath sounds: Normal breath sounds. No stridor. No wheezing, rhonchi or rales. Chest:      Chest wall: No tenderness. Abdominal:      General: Abdomen is flat. Palpations: Abdomen is soft. Tenderness: There is no abdominal tenderness. There is no right CVA tenderness, left CVA tenderness, guarding or rebound. Musculoskeletal:         General: Normal range of motion. Cervical back: Full passive range of motion without pain, normal range of motion and neck supple. No rigidity or tenderness. Lymphadenopathy:      Cervical: No cervical adenopathy. Skin:     General: Skin is warm and dry. Capillary Refill: Capillary refill takes less than 2 seconds. Findings: No rash. Neurological:      General: No focal deficit present. Mental Status: He is alert and oriented to person, place, and time.    Psychiatric:         Mood and Affect: Mood normal.           Diagnostic Study Results     Labs -  Recent Results (from the past 12 hour(s))   EKG, 12 LEAD, INITIAL    Collection Time: 05/16/22  4:28 PM   Result Value Ref Range    Ventricular Rate 71 BPM    Atrial Rate 71 BPM    P-R Interval 172 ms    QRS Duration 98 ms    Q-T Interval 376 ms    QTC Calculation (Bezet) 408 ms    Calculated P Axis 50 degrees    Calculated R Axis 52 degrees    Calculated T Axis 46 degrees    Diagnosis       Normal sinus rhythm with sinus arrhythmia  Nonspecific ST abnormality  Abnormal ECG  No previous ECGs available  Confirmed by Jenni Patel MD, Lazara Peacock ([id number]) on 5/16/2022 4:44:43 PM     CBC WITH AUTOMATED DIFF    Collection Time: 05/16/22  4:40 PM   Result Value Ref Range    WBC 4.8 4.6 - 13.2 K/uL    RBC 5.86 (H) 4.35 - 5.65 M/uL    HGB 15.0 13.0 - 16.0 g/dL    HCT 46.2 36.0 - 48.0 %    MCV 78.8 78.0 - 100.0 FL    MCH 25.6 24.0 - 34.0 PG    MCHC 32.5 31.0 - 37.0 g/dL    RDW 13.8 11.6 - 14.5 %    PLATELET 912 625 - 605 K/uL    MPV 9.0 (L) 9.2 - 11.8 FL    NRBC 0.0 0  WBC    ABSOLUTE NRBC 0.00 0.00 - 0.01 K/uL    NEUTROPHILS 58 40 - 73 %    LYMPHOCYTES 32 21 - 52 %    MONOCYTES 8 3 - 10 %    EOSINOPHILS 2 0 - 5 %    BASOPHILS 0 0 - 2 %    IMMATURE GRANULOCYTES 0 0.0 - 0.5 %    ABS. NEUTROPHILS 2.8 1.8 - 8.0 K/UL    ABS. LYMPHOCYTES 1.5 0.9 - 3.6 K/UL    ABS. MONOCYTES 0.4 0.05 - 1.2 K/UL    ABS. EOSINOPHILS 0.1 0.0 - 0.4 K/UL    ABS. BASOPHILS 0.0 0.0 - 0.1 K/UL    ABS. IMM. GRANS. 0.0 0.00 - 0.04 K/UL    DF AUTOMATED     METABOLIC PANEL, COMPREHENSIVE    Collection Time: 05/16/22  4:40 PM   Result Value Ref Range    Sodium 140 136 - 145 mmol/L    Potassium 4.2 3.5 - 5.5 mmol/L    Chloride 107 100 - 111 mmol/L    CO2 25 21 - 32 mmol/L    Anion gap 8 3.0 - 18 mmol/L    Glucose 91 74 - 99 mg/dL    BUN 12 7.0 - 18 MG/DL    Creatinine 1.16 0.6 - 1.3 MG/DL    BUN/Creatinine ratio 10 (L) 12 - 20      GFR est AA >60 >60 ml/min/1.73m2    GFR est non-AA >60 >60 ml/min/1.73m2    Calcium 10.0 8.5 - 10.1 MG/DL    Bilirubin, total 0.7 0.2 - 1.0 MG/DL    ALT (SGPT) 38 16 - 61 U/L    AST (SGOT) 29 10 - 38 U/L    Alk. phosphatase 59 45 - 117 U/L    Protein, total 8.4 (H) 6.4 - 8.2 g/dL    Albumin 4.4 3.4 - 5.0 g/dL    Globulin 4.0 2.0 - 4.0 g/dL    A-G Ratio 1.1 0.8 - 1.7     TROPONIN-HIGH SENSITIVITY    Collection Time: 05/16/22  4:40 PM   Result Value Ref Range    Troponin-High Sensitivity 10 0 - 78 ng/L   MAGNESIUM    Collection Time: 05/16/22  4:40 PM   Result Value Ref Range    Magnesium 2.3 1.6 - 2.6 mg/dL       Radiologic Studies -   XR CHEST PA LAT    (Results Pending)         Medical Decision Making   I am the first provider for this patient.     I reviewed available nursing notes, past medical history, past surgical history, family history and social history. Vital Signs-Reviewed the patient's vital signs. Records Reviewed: Nursing Notes and Old Medical Records (Time of Review: 5:17 PM)    Pulse Oximetry Analysis - 99% on RA- normal     EKG: Normal sinus rhythm with sinus arrhythmia. Nonspecific ST wave abnormality. Ventricular rate of 71 bpm with no acute ST elevation or depression. ED Course: Progress Notes, Reevaluation, and Consults:  5:17 PM  Initial assessment performed. The patients presenting problems have been discussed, and they/their family are in agreement with the care plan formulated and outlined with them. I have encouraged them to ask questions as they arise throughout their visit. 5:18 PM CBC shows no leukocytosis, anemia, shift, or bands. CMP is unremarkable and troponin is normal limits at 10. Chest x-ray clear. EKG without acute changes. Suspect this is all related to his anxiety and hyperventilation. Discussed close follow-up with PCP. Provider Notes (Medical Decision Making):     Patient is an otherwise healthy 79-year-old male presents to the ER by EMS after a brief syncopal episode lasting just a few seconds associated with chest pain after he was hyperventilating and having a panic attack after receiving some bad news from information he saw on [employer] on his phone while he was at work today. No associated diaphoresis or vomiting. He does not have any personal cardiac history. He does have history depression and anxiety which she is on Wellbutrin. Feels like his medication has not been working like it always has. He denies any suicidal or homicidal ideations. Diagnosis     Clinical Impression:   1. Anxiety reaction    2. Syncope and collapse        Disposition: Discharged home in stable condition     DISCHARGE NOTE:     Patient has been reexamined. Patient has no new complaints, changes, or physical findings. Care plan outlined and precautions discussed.   Results of labs, EKG, chest x-ray were reviewed with the patient. All medications were reviewed with the patient; will discharge home with continuation of home medications. All of patient's questions and concerns were addressed. Patient was instructed and agrees to follow up with PCP, as well as to return to the ED upon further deterioration. Patient is ready to go home. Follow-up Information     Follow up With Specialties Details Why Contact Info    Vanesa Martin MD [facility] Schedule an appointment as soon as possible for a visit  Follow-up from the Emergency Department [address] [place] [address] EMERGENCY DEPT Emergency Medicine  As needed, If symptoms worsen [address]  [phone]           Current Discharge Medication List            Dictation disclaimer:  Please note that this dictation was completed with [employer], the computer voice recognition software. Quite often unanticipated grammatical, syntax, homophones, and other interpretive errors are inadvertently transcribed by the computer software. Please disregard these errors. Please excuse any errors that have escaped final proofreading.

## 2022-05-16 NOTE — Clinical Note
[address] [place] [address] Dr [facility] EMERGENCY DEPT  [id number] [address] [phone]    Work/School Note    Date: 5/16/2022    To Whom It May concern:    Bryce Galan was seen and treated today in the emergency room by the following provider(s):  Attending Provider: Maria Antonia Cali MD  Nurse Practitioner: TYLER Copeland. Bryce Galan is excused from work/school on 05/16/22 and 05/17/22. He is medically clear to return to work/school on 5/18/2022.        Sincerely,          TYLER Michel

## 2022-05-16 NOTE — ED NOTES
I performed a brief history of the patient here in triage and I have determined that pt will need further treatment and evaluation from the main side ER physician or SD. I have placed initial orders based on the history to help in expediting patients care. Pt states he started hyperventilating, had CP, and passed out.      Visit Vitals  /81 (BP 1 Location: Left arm, BP Patient Position: Sitting)   Pulse 69   Temp 97.8 °F (36.6 °C)   Resp 18   SpO2 99%      JOYA Yee

## 2022-05-16 NOTE — ED TRIAGE NOTES
Per Pt \" I was working and I saw something on my phone \" Pt reports he became anxious hyperventilated and possibly passed out

## 2022-05-21 ENCOUNTER — HOSPITAL ENCOUNTER (INPATIENT)
Age: 27
LOS: 4 days | Discharge: HOME | DRG: 751 | End: 2022-05-27
Attending: STUDENT IN AN ORGANIZED HEALTH CARE EDUCATION/TRAINING PROGRAM | Admitting: PSYCHIATRY & NEUROLOGY
Payer: MEDICAID

## 2022-05-21 LAB
BASOPHILS # BLD: 0 K/UL (ref 0–0.1)
BASOPHILS NFR BLD: 1 % (ref 0–2)
DIFFERENTIAL METHOD BLD: ABNORMAL
EOSINOPHIL # BLD: 0.1 K/UL (ref 0–0.4)
EOSINOPHIL NFR BLD: 2 % (ref 0–5)
ERYTHROCYTE [DISTWIDTH] IN BLOOD BY AUTOMATED COUNT: 14 % (ref 11.6–14.5)
HCT VFR BLD AUTO: 44.1 % (ref 36–48)
HGB BLD-MCNC: 14 G/DL (ref 13–16)
IMM GRANULOCYTES # BLD AUTO: 0 K/UL (ref 0–0.04)
IMM GRANULOCYTES NFR BLD AUTO: 0 % (ref 0–0.5)
LYMPHOCYTES # BLD: 2 K/UL (ref 0.9–3.6)
LYMPHOCYTES NFR BLD: 37 % (ref 21–52)
MCH RBC QN AUTO: 25.5 PG (ref 24–34)
MCHC RBC AUTO-ENTMCNC: 31.7 G/DL (ref 31–37)
MCV RBC AUTO: 80.5 FL (ref 78–100)
MONOCYTES # BLD: 0.4 K/UL (ref 0.05–1.2)
MONOCYTES NFR BLD: 8 % (ref 3–10)
NEUTS SEG # BLD: 2.8 K/UL (ref 1.8–8)
NEUTS SEG NFR BLD: 52 % (ref 40–73)
NRBC # BLD: 0 K/UL (ref 0–0.01)
NRBC BLD-RTO: 0 PER 100 WBC
PLATELET # BLD AUTO: 334 K/UL (ref 135–420)
PMV BLD AUTO: 8.9 FL (ref 9.2–11.8)
RBC # BLD AUTO: 5.48 M/UL (ref 4.35–5.65)
WBC # BLD AUTO: 5.4 K/UL (ref 4.6–13.2)

## 2022-05-21 PROCEDURE — 82077 ASSAY SPEC XCP UR&BREATH IA: CPT

## 2022-05-21 PROCEDURE — 80307 DRUG TEST PRSMV CHEM ANLYZR: CPT

## 2022-05-21 PROCEDURE — 99285 EMERGENCY DEPT VISIT HI MDM: CPT

## 2022-05-21 PROCEDURE — 85025 COMPLETE CBC W/AUTO DIFF WBC: CPT

## 2022-05-21 PROCEDURE — 80048 BASIC METABOLIC PNL TOTAL CA: CPT

## 2022-05-22 LAB
AMPHET UR QL SCN: NEGATIVE
ANION GAP SERPL CALC-SCNC: 6 MMOL/L (ref 3–18)
BARBITURATES UR QL SCN: NEGATIVE
BENZODIAZ UR QL: NEGATIVE
BUN SERPL-MCNC: 14 MG/DL (ref 7–18)
BUN/CREAT SERPL: 11 (ref 12–20)
CALCIUM SERPL-MCNC: 9.8 MG/DL (ref 8.5–10.1)
CANNABINOIDS UR QL SCN: NEGATIVE
CHLORIDE SERPL-SCNC: 107 MMOL/L (ref 100–111)
CO2 SERPL-SCNC: 26 MMOL/L (ref 21–32)
COCAINE UR QL SCN: NEGATIVE
CREAT SERPL-MCNC: 1.23 MG/DL (ref 0.6–1.3)
ETHANOL SERPL-MCNC: <3 MG/DL (ref 0–3)
FLUAV RNA SPEC QL NAA+PROBE: NOT DETECTED
FLUBV RNA SPEC QL NAA+PROBE: NOT DETECTED
GLUCOSE SERPL-MCNC: 83 MG/DL (ref 74–99)
HDSCOM,HDSCOM: NORMAL
METHADONE UR QL: NEGATIVE
OPIATES UR QL: NEGATIVE
PCP UR QL: NEGATIVE
POTASSIUM SERPL-SCNC: 3.6 MMOL/L (ref 3.5–5.5)
SARS-COV-2, COV2: NOT DETECTED
SODIUM SERPL-SCNC: 139 MMOL/L (ref 136–145)

## 2022-05-22 PROCEDURE — 87636 SARSCOV2 & INF A&B AMP PRB: CPT

## 2022-05-22 NOTE — BSMART NOTE
Behavioral Health Crisis Assessment    Chief Complaint:   Chief Complaint   Patient presents with    Mental Health Problem      BSMART Consult requested by  for suicidal ideation and depression. Patient arrived to [facility] ED with his coworker who brought him in due to him being in such a depressive state. Mental Status Exam: Monica Ashton is a 30 y/o Male. Patient presented as appropriate, alert and oriented to person, place, time and situation. Patient appeared stated age, wearing hospital scrubs . Patient had appropriate posture, consistent eye contact and presented with cooperative attitude, depressed mood and Blunted affect. Thought process was Clear with Suicidal content. Memory shows no evidence of impairment. Patient's speech was Goal directed . Judgement is Fair and insight is limited. Assessment: Patient reported that he came in today for \"mental health help\" after having recently broken up with his fiance of 3 years whom he has a daughter with. Patient reported that since the break up he has been suicidal and having visions of ways to kill himself such as: walking in front of a car, slitting his wrists, or hanging himself. Patient denied HI/AH/delusions; however patient did describe having a \"special power\" where he \"has a dream about how someone dies and then it'll happen the next day like in my dream.\" Pt was unable to contract for safety, stating that he is at home all by himself and did not have anyone he can call or stay with.  When clinician informed patient that his inability to contract for safety deemed him appropriate for inpatient treatment, he immediately started listing all of the reasons he could not go (I.e. work, an interview tomorrow) but was still unable to contract for safety or provide clinician with anyone who may be willing to take responsibility for his safety should he discharge home. After explaining, patient agreed to inpatient treatment and said \"I've been feeling like this since I was [place] y/o and no one ever believed me, it's time for me to do something about it. \"     C-SSRS: High    Psychiatric History: Patient reports that he has been experiencing suicidal ideation since he was approximately [place][age]Rose[age] y/o. Since the breakup he has begun seeing a psychiatrist at [facility] and had his intake appt yesterday, and was prescribed medication for depression. Substance Use History: Denies    Treatment History: Denies    Psychosocial History: Patient reports living at home with his significant other and daughter until the break-up. Patient is Employed at [employer]. History of Trauma/Abuse: Denies    Protective Factors: Responsibilities    Risk Factors: Significant personal loss, Panic attacks/Severe anxiety and Social Isolation/Single/Living alone    Legal History/Violence towards Others: No     Access to weapons: No     Disposition/Legal Status: Patient is Voluntary for inpatient admission for suicidal ideation and depression. Patient has been medically cleared.  consulted and agrees with disposition.

## 2022-05-22 NOTE — BSMART NOTE
Crisis Note: Responded to 's [facility] consult for SI. Crisis counselor to assess as soon as possible, pending medical clearance and toxicology.

## 2022-05-22 NOTE — ED PROVIDER NOTES
EMERGENCY DEPARTMENT HISTORY AND PHYSICAL EXAM    12:13 AM      Date: 5/21/2022  Patient Name: Missy Qureshi. History of Presenting Illness     Chief Complaint   Patient presents with   [address] Problem         History Provided By: Patient  Location/Duration/Severity/Modifying factors   Patient is a 48-year-old male with history of depression on Wellbutrin presenting due to depression with SI. Patient states that roughly an hour before arrival to the emergency department his significant other broke up with him. Patient stated that after finding out he started to have severe depression with thoughts of harming himself such as jumping in front of a car or slitting his wrists. Patient did not want to go home to be alone thinking that he may harm self. PCP: Dalia Love MD    Current Outpatient Medications   Medication Sig Dispense Refill    atorvastatin (LIPITOR) 20 mg tablet Take 1 Tablet by mouth nightly. 30 Tablet 1    buPROPion XL (WELLBUTRIN XL) 300 mg XL tablet Take 1 Tablet by mouth every morning. 30 Tablet 11       Past History     Past Medical History:  Past Medical History:   Diagnosis Date    Hypercholesterolemia     Lead poisoning childhood    Medically noncompliant     2021 - lost to follow-up, did not continue agreed upon careplan with treatment of high chol and depression     Obesity     Prediabetes        Past Surgical History:  No past surgical history on file. Family History:  Family History   Problem Relation Age of Onset    Diabetes Mother     Heart Failure Mother     Hypertension Mother     COPD Father     Diabetes Brother        Social History:  Social History     Tobacco Use    Smoking status: Never Smoker    Smokeless tobacco: Never Used   Substance Use Topics    Alcohol use: No    Drug use: No       Allergies:  No Known Allergies      Review of Systems       Review of Systems   Constitutional: Negative for activity change, fatigue and fever. HENT: Negative for congestion and rhinorrhea. Eyes: Negative for visual disturbance. Respiratory: Negative for shortness of breath. Cardiovascular: Negative for chest pain and palpitations. Gastrointestinal: Negative for abdominal pain, diarrhea, nausea and vomiting. Genitourinary: Negative for dysuria and hematuria. Musculoskeletal: Negative for back pain. Skin: Negative for rash. Allergic/Immunologic: Negative for immunocompromised state. Neurological: Negative for dizziness, weakness and light-headedness. Psychiatric/Behavioral: Positive for suicidal ideas. Depressed mood         Physical Exam     Visit Vitals  /85   Pulse 63   Resp 18   Ht 5' 6\" (1.676 m)   Wt 97.1 kg (214 lb)   SpO2 98%   BMI 34.54 kg/m²         Physical Exam  Vitals and nursing note reviewed. Constitutional:       General: He is not in acute distress. Appearance: He is well-developed. HENT:      Head: Normocephalic and atraumatic. Right Ear: External ear normal.      Left Ear: External ear normal.      Nose: Nose normal.   Eyes:      Extraocular Movements: Extraocular movements intact. Conjunctiva/sclera: Conjunctivae normal.   Neck:      Thyroid: No thyromegaly. Vascular: No JVD. Trachea: No tracheal deviation. Cardiovascular:      Rate and Rhythm: Normal rate and regular rhythm. Heart sounds: Normal heart sounds. No murmur heard. No friction rub. No gallop. Pulmonary:      Effort: Pulmonary effort is normal.      Breath sounds: Normal breath sounds. Chest:      Chest wall: No tenderness. Abdominal:      General: Bowel sounds are normal. There is no distension. Palpations: Abdomen is soft. Tenderness: There is no abdominal tenderness. There is no guarding. Musculoskeletal:         General: No tenderness. Normal range of motion. Cervical back: Normal range of motion and neck supple. Skin:     General: Skin is warm and dry.    Neurological: General: No focal deficit present. Mental Status: He is alert and oriented to person, place, and time. Coordination: Coordination normal.      Comments: No sensory loss, Gait normal, Motor 5/5   Psychiatric:      Comments: Depressed mood with thoughts of SI           Diagnostic Study Results     Labs -  Recent Results (from the past 12 hour(s))   CBC WITH AUTOMATED DIFF    Collection Time: 05/21/22 11:38 PM   Result Value Ref Range    WBC 5.4 4.6 - 13.2 K/uL    RBC 5.48 4.35 - 5.65 M/uL    HGB 14.0 13.0 - 16.0 g/dL    HCT 44.1 36.0 - 48.0 %    MCV 80.5 78.0 - 100.0 FL    MCH 25.5 24.0 - 34.0 PG    MCHC 31.7 31.0 - 37.0 g/dL    RDW 14.0 11.6 - 14.5 %    PLATELET 185 561 - 425 K/uL    MPV 8.9 (L) 9.2 - 11.8 FL    NRBC 0.0 0  WBC    ABSOLUTE NRBC 0.00 0.00 - 0.01 K/uL    NEUTROPHILS 52 40 - 73 %    LYMPHOCYTES 37 21 - 52 %    MONOCYTES 8 3 - 10 %    EOSINOPHILS 2 0 - 5 %    BASOPHILS 1 0 - 2 %    IMMATURE GRANULOCYTES 0 0.0 - 0.5 %    ABS. NEUTROPHILS 2.8 1.8 - 8.0 K/UL    ABS. LYMPHOCYTES 2.0 0.9 - 3.6 K/UL    ABS. MONOCYTES 0.4 0.05 - 1.2 K/UL    ABS. EOSINOPHILS 0.1 0.0 - 0.4 K/UL    ABS. BASOPHILS 0.0 0.0 - 0.1 K/UL    ABS. IMM.  GRANS. 0.0 0.00 - 0.04 K/UL    DF AUTOMATED     METABOLIC PANEL, BASIC    Collection Time: 05/21/22 11:38 PM   Result Value Ref Range    Sodium 139 136 - 145 mmol/L    Potassium 3.6 3.5 - 5.5 mmol/L    Chloride 107 100 - 111 mmol/L    CO2 26 21 - 32 mmol/L    Anion gap 6 3.0 - 18 mmol/L    Glucose 83 74 - 99 mg/dL    BUN 14 7.0 - 18 MG/DL    Creatinine 1.23 0.6 - 1.3 MG/DL    BUN/Creatinine ratio 11 (L) 12 - 20      GFR est AA >60 >60 ml/min/1.73m2    GFR est non-AA >60 >60 ml/min/1.73m2    Calcium 9.8 8.5 - 10.1 MG/DL   ETHYL ALCOHOL    Collection Time: 05/21/22 11:38 PM   Result Value Ref Range    ALCOHOL(ETHYL),SERUM <3 0 - 3 MG/DL   DRUG SCREEN, URINE    Collection Time: 05/21/22 11:45 PM   Result Value Ref Range    BENZODIAZEPINES Negative NEG      BARBITURATES Negative NEG      THC (TH-CANNABINOL) Negative NEG      OPIATES Negative NEG      PCP(PHENCYCLIDINE) Negative NEG      COCAINE Negative NEG      AMPHETAMINES Negative NEG      METHADONE Negative NEG      HDSCOM (NOTE)        Radiologic Studies -   No orders to display         Medical Decision Making   I am the first provider for this patient. I reviewed the vital signs, available nursing notes, past medical history, past surgical history, family history and social history. Vital Signs-Reviewed the patient's vital signs. EKG:     Records Reviewed: Nursing Notes (Time of Review: 12:13 AM)    ED Course: Progress Notes, Reevaluation, and Consults:         Provider Notes (Medical Decision Making):   MDM  Number of Diagnoses or Management Options  Diagnosis management comments: Patient is a 41-year-old male with history of depression on Wellbutrin presenting due to depression with SI. Patient presented with depression and thoughts of SI with possible plan. No current complaints at this time. We will do labs for medical clearance as patient likely will need psychiatric evaluation and admission. Procedures    Critical Care Time:       Diagnosis     Clinical Impression: No diagnosis found. Disposition: Likely Admission    Follow-up Information    None          Patient's Medications   Start Taking    No medications on file   Continue Taking    ATORVASTATIN (LIPITOR) 20 MG TABLET    Take 1 Tablet by mouth nightly. BUPROPION XL (WELLBUTRIN XL) 300 MG XL TABLET    Take 1 Tablet by mouth every morning. These Medications have changed    No medications on file   Stop Taking    No medications on file     Disclaimer: Sections of this note are dictated using utilizing voice recognition software. Minor typographical errors may be present. If questions arise, please do not hesitate to contact me or call our department.

## 2022-05-22 NOTE — BSMART NOTE
Crisis Note: On rounds, Patient was reassessed. Patient stated, Beatrice Sanderson came to the ED because of a breakup initially\". Patient denies SI/HI as he stated, Not at this moment. Patient stated that he does not have a plan to harm himself. Patient did not exhibit any psychotic behaviors. Patient denies A/V hallucinations. Crisis will assist as needed.

## 2022-05-22 NOTE — ED TRIAGE NOTES
Patient comes in stating that his significant other broke up with him and now he feels depressed and feels like he wants to hurt himself. Patient denies HI but states that he is feeling suicidal. Patient states that he does not have a plan but is having \"visions\" on how he would go out, such as slitting his wrists, hanging himself or jumping in front of a car. Dr. Dylan Martinez in triage to assess patient.

## 2022-05-22 NOTE — ED NOTES
Patient sitting up in bed alert and oriented, cooperative and pleasant with this writer. He states his girlfriend who he has been engaged to broke up with him last night. He states he wants to hurt himself, but does not have a plan.

## 2022-05-22 NOTE — BSMART NOTE
Crisis Note: Bed Search Update    [facility]: at capacity    [address]: at capacity    [facility]: at [facility]: no answer    [address]: faxed with success at 0230, under review    [place]: no answer

## 2022-05-22 NOTE — ED NOTES
Patient requested his drawing material since that makes him feel better. It was taken out of his locked items and he is currently sitting on the stretcher drawing.

## 2022-05-23 PROBLEM — F32.A DEPRESSION: Status: ACTIVE | Noted: 2022-05-23

## 2022-05-23 PROCEDURE — 65220000003 HC RM SEMIPRIVATE PSYCH

## 2022-05-23 PROCEDURE — 74011250637 HC RX REV CODE- 250/637: Performed by: STUDENT IN AN ORGANIZED HEALTH CARE EDUCATION/TRAINING PROGRAM

## 2022-05-23 PROCEDURE — 74011250637 HC RX REV CODE- 250/637: Performed by: PSYCHIATRY & NEUROLOGY

## 2022-05-23 RX ORDER — ATORVASTATIN CALCIUM 10 MG/1
20 TABLET, FILM COATED ORAL
Status: DISCONTINUED | OUTPATIENT
Start: 2022-05-23 | End: 2022-05-27 | Stop reason: HOSPADM

## 2022-05-23 RX ORDER — HYDROXYZINE PAMOATE 50 MG/1
50 CAPSULE ORAL
Status: DISCONTINUED | OUTPATIENT
Start: 2022-05-23 | End: 2022-05-27 | Stop reason: HOSPADM

## 2022-05-23 RX ORDER — TRAZODONE HYDROCHLORIDE 50 MG/1
50 TABLET ORAL
Status: DISCONTINUED | OUTPATIENT
Start: 2022-05-23 | End: 2022-05-27 | Stop reason: HOSPADM

## 2022-05-23 RX ORDER — BUPROPION HYDROCHLORIDE 300 MG/1
300 TABLET ORAL
Status: DISCONTINUED | OUTPATIENT
Start: 2022-05-23 | End: 2022-05-24

## 2022-05-23 RX ADMIN — ATORVASTATIN CALCIUM 20 MG: 20 TABLET, FILM COATED ORAL at 21:33

## 2022-05-23 RX ADMIN — HYDROXYZINE PAMOATE 50 MG: 50 CAPSULE ORAL at 21:33

## 2022-05-23 RX ADMIN — TRAZODONE HYDROCHLORIDE 50 MG: 50 TABLET ORAL at 21:33

## 2022-05-23 RX ADMIN — BUPROPION HYDROCHLORIDE 300 MG: 300 TABLET, FILM COATED, EXTENDED RELEASE ORAL at 17:51

## 2022-05-23 NOTE — BSMART NOTE
Crisis note: Patient was reassessed. Patient continues to endorse SI. Patient stated, \"I made a phone call to one of those help line and I was able to talk to someone. I also talked to one of the nurse about what is happening\". Patient denies AVH or did not exhibit any psychotic behaviors. Crisis will continue to assist as needed.

## 2022-05-23 NOTE — ED NOTES
Assumed care of pt. Pt currently resting in bed tearful. Pt states he is uncertain of the future and his future with his fiance. He reveals that she is threatening to take his child from him. Pt has a flat affect. Otherwise NAD noted.  Equal rise and fall of chest.

## 2022-05-23 NOTE — ED NOTES
Patient emotional,  States his girlfriend texted him that he was not coming back to apartment and she was going to take a restraining order on him. Quietly listened to pt as he talked.

## 2022-05-23 NOTE — ED NOTES
Suicide and Psychiatric Awareness/Precautions Observation Flowsheet utilized by sitter at pt bedside

## 2022-05-23 NOTE — ED NOTES
TRANSFER - OUT REPORT:    Verbal report given to [facility], RN(name) on [address].  being transferred to Behavioral Health([facility]) for routine progression of care. Report consisted of patients Situation, Background, Assessment and   Recommendations(SBAR). Information from the following report(s) SBAR, ED Summary and MAR was reviewed with the receiving nurse. Lines:   Peripheral IV 05/21/22 Right Antecubital (Active)   Site Assessment Clean, dry, & intact 05/22/22 0006   Phlebitis Assessment 0 05/22/22 0006   Infiltration Assessment 0 05/22/22 0006   Dressing Status Clean, dry, & intact 05/22/22 0006        Opportunity for questions and clarification was provided.       Patient transported with:   [facility]

## 2022-05-23 NOTE — ED NOTES
Bedside shift change report given to [facility] Dung (oncoming nurse) by KAREN (offgoing nurse). Report included the following information SBAR, Kardex, ED Summary, Intake/Output, MAR, Recent Results, Alarm Parameters  and Quality Measures.

## 2022-05-24 PROCEDURE — 99222 1ST HOSP IP/OBS MODERATE 55: CPT | Performed by: PSYCHIATRY & NEUROLOGY

## 2022-05-24 PROCEDURE — 74011250637 HC RX REV CODE- 250/637: Performed by: STUDENT IN AN ORGANIZED HEALTH CARE EDUCATION/TRAINING PROGRAM

## 2022-05-24 PROCEDURE — 74011250637 HC RX REV CODE- 250/637: Performed by: PSYCHIATRY & NEUROLOGY

## 2022-05-24 PROCEDURE — 65220000003 HC RM SEMIPRIVATE PSYCH

## 2022-05-24 RX ORDER — ESCITALOPRAM OXALATE 10 MG/1
10 TABLET ORAL DAILY
Status: DISCONTINUED | OUTPATIENT
Start: 2022-05-25 | End: 2022-05-27 | Stop reason: HOSPADM

## 2022-05-24 RX ADMIN — TRAZODONE HYDROCHLORIDE 50 MG: 50 TABLET ORAL at 20:00

## 2022-05-24 RX ADMIN — BUPROPION HYDROCHLORIDE 300 MG: 300 TABLET, FILM COATED, EXTENDED RELEASE ORAL at 06:30

## 2022-05-24 RX ADMIN — HYDROXYZINE PAMOATE 50 MG: 50 CAPSULE ORAL at 20:00

## 2022-05-24 RX ADMIN — ATORVASTATIN CALCIUM 20 MG: 20 TABLET, FILM COATED ORAL at 20:00

## 2022-05-24 NOTE — H&P
Psychiatry History and Physical    Subjective:     Date of Evaluation:  5/24/2022    Reason for Referral:  Tyrone Carr was referred to the examiners from  ED for SI. History of Presenting Problem: Tyrone Carr Is a 32 yo M with PMH Depression, Insomnia, HLD, prediabetes who was admitted for SI. Denies HI and hallucinations. Tox screen, EtOH and Rapid COVID all negative. He denies any physical complaints today. Patient Active Problem List    Diagnosis Date Noted    Depression 05/23/2022    Depression, major, recurrent, mild ([address] 10/06/2020    Primary insomnia 07/17/2019    Dyslipidemia 08/30/2018    Severe obesity (BMI 35.0-39.9) 08/30/2018    BMI 38.0-38.9,adult 07/24/2018     Past Medical History:   Diagnosis Date    Hypercholesterolemia     Lead poisoning childhood    Medically noncompliant     2021 - lost to follow-up, did not continue agreed upon careplan with treatment of high chol and depression     Obesity     Prediabetes      No past surgical history on file. Family History   Problem Relation Age of Onset    Diabetes Mother     Heart Failure Mother     Hypertension Mother     COPD Father     Diabetes Brother       Social History     Tobacco Use    Smoking status: Never Smoker    Smokeless tobacco: Never Used   Substance Use Topics    Alcohol use: No     Prior to Admission medications    Medication Sig Start Date End Date Taking? Authorizing Provider   atorvastatin (LIPITOR) 20 mg tablet Take 1 Tablet by mouth nightly. Patient not taking: Reported on 5/23/2022 4/4/22   Polina Haynes MD   buPROPion XL (WELLBUTRIN XL) 300 mg XL tablet Take 1 Tablet by mouth every morning.  4/4/22   Polina Haynes MD     No Known Allergies     Review of Systems - History obtained from chart review and the patient  General ROS: negative  Psychological ROS: positive for - depression and suicidal ideation  Ophthalmic ROS: negative  ENT ROS: negative  Respiratory ROS: negative  Cardiovascular ROS: negative  Gastrointestinal ROS: negative  Musculoskeletal ROS: negative  Neurological ROS: negative  Dermatological ROS: negative      Objective:     No data found. Mental Status exam: WNL except for    Sensorium  oriented to time, place and person   Orientation situation   Relations cooperative   Eye Contact appropriate   Appearance:  age appropriate   Motor Behavior:  within normal limits   Speech:  normal pitch and normal volume   Vocabulary average   Thought Process: goal directed   Thought Content free of delusions and free of hallucinations   Suicidal ideations intention   Homicidal ideations none   Mood:  stable   Affect:  stable   Memory recent  adequate   Memory remote:  adequate   Concentration:  adequate   Abstraction:  concrete   Insight:  good   Reliability good   Judgment:  fair         Physical Exam:   Visit Vitals  /78   Pulse 70   Temp 97.3 °F (36.3 °C)   Resp 18   Ht 5' 6\" (1.676 m)   Wt 97.1 kg (214 lb)   SpO2 99%   BMI 34.54 kg/m²     General appearance: alert, cooperative, no distress, appears stated age  Head: Normocephalic, without obvious abnormality, atraumatic  Eyes: negative  Ears: normal TM's and external ear canals AU  Nose: Nares normal. Septum midline. Mucosa normal. No drainage or sinus tenderness. Throat: Lips, mucosa, and tongue normal. Teeth and gums normal  Neck: supple, symmetrical, trachea midline and no adenopathy  Lungs: clear to auscultation bilaterally  Heart: regular rate and rhythm, S1, S2 normal, no murmur, click, rub or gallop  Abdomen: soft, non-tender.    Extremities: extremities normal, atraumatic, no cyanosis or edema  Skin: Skin color, texture, turgor normal. No rashes or lesions  Neurologic: Grossly normal        Impression:      Active Problems:    Depression (5/23/2022)          Plan:     Recommendations for Treatment/Conditions:  Psychiatric treatment recommended while in hospital  Admit to inpatient psych for SI    Referral To:    Inpatient psychiatric care      [place], [place]   5/24/2022 9:29 AM

## 2022-05-24 NOTE — H&P
[facility] HISTORY AND PHYSICAL    Name:  Bro Quan  MR#:   [id number]  :  1995  ACCOUNT #:  [de-identified]  ADMIT DATE:  2022    Admission to the emergency room was 2022. Admission to the unit was on 2022. IDENTIFYING INFORMATION:  The patient is a 40-year-old male, single, admitted to this facility on a voluntary basis on the above-mentioned date. BASIS FOR ADMISSION:  The patient presented himself to [facility] Emergency Department with a history of having problems with being increasingly depressed with suicidal ideations. The patient when initially seen had indicated that roughly \"an hour before arrival to the emergency department, his significant other broke up with him. \"  The patient stated that after finding this out, he started to have severe depression with thoughts of harming himself such as jumping in front of a car or slitting his wrists. The patient did not want to go home to be alone thinking that he may harm himself. The patient was then presented to the physician on-call who kindly admitted the patient to my service. PSYCHIATRIC HISTORY:  This is the first time in which the patient is treated for symptoms of depression as an inpatient; however, he has had a history of consulting as an outpatient with a provider in the [place] area. He has been prescribed with Wellbutrin XL which he takes on a very irregular basis with his indicating that during the last couple of weeks, perhaps he took it a total of 5 days. Regardless, the patient does describe being involved with his girlfriend with whom he has a daughter, who is 3year old, for the last 4 years.   He described trying his best to improve the way that he is taking care of them and specifically to improve the relationship with his girlfriend; however, it appears that for whatever reason, she basically became very tired of how things were and decided to end the relationship. It appears also that the patient knew about it by looking into Facebook and not by his girlfriend calling him directly. It is not clear if since then he has been able to find out from her directly as to the reason for which she decided to end the relationship in itself. As an outpatient, the patient has been followed by Dr. Madhavi Moralez in [place]. Again, the only prescription given to the patient is for Wellbutrin  mg a day which he is taking only off and on, on a very irregular basis. For obvious reasons, there has been no evidence of improvement with the prescription of this antidepressant. MEDICAL HISTORY:  The patient has a history of dyslipidemia and has been prescribed in the past with Lipitor 20 mg every night at bedtime. He has been described as being obese in the past and has suffered with prediabetes. ALLERGIES:  HE HAS A NEGATIVE HISTORY OF MEDICATIONS AND/OR FOOD-RELATED ALLERGIES. REVIEW OF SYSTEMS:  Only remarkable from a psychiatric point of view with the presence of suicidal ideations and a depressed mood. The rest of the review of systems is negative. OBJECTIVE FINDINGS:  Physical exam is that of a patient with a blood pressure 127/85, pulse 63, respirations 18, height 5 feet 6 inches, weight 214 pounds with a BMI of 34.54. Oxygen saturation rate 98% on room air. Physical exam was described as negative with exception of his neurological examination that showed, by the way, negative findings and his psychiatric examination in which he described as depressed mood with thoughts of suicide. Multiple labs were performed at the time of the patient's evaluation in the emergency room including a CBC with differential that showed completely normal test results. A BMP showed normal results with a sodium 139, potassium 3.6, chloride of 107, blood sugar 83, BUN 14, creatinine 1.23, estimated GFR above 60 mL/min.   Liver function tests on 05/16/2022 were completely normal by the way. Alcohol levels below 3. Urine drug screen was negative. COVID test by SARS showed negative results for the COVID virus, influenza A and influenza B. There is a chest x-ray on 2022 on the patient's previous consultation with emergency room, which showed negative results. ALCOHOL AND DRUG HISTORY:  The patient denies use of alcohol, illicit drugs, abusing over-the-counter medications or prescription medications. PERSONAL HISTORY AND FAMILY HISTORY:  The patient's father  around 6 years ago of COPD. The patient's mother is currently hospitalized due to some cardiac issues. Apparently, she had just moved to [place], and the patient for that reason has not seen her for a while. It appears that the relationship with his mother is very distant. He is the third of four siblings. He has one sister and two brothers. He is only close to one of them. Again, the patient was very hesitant to talk about his family. Most of the initial session had to deal with the relationship with his girlfriend of 4 years. The patient indicated that he had been trying very hard to improve the relationship. Again, it appears that his girlfriend was tired of his ways of being. Otherwise, the patient works for [employer] in sales, the specifics not clear. Educationally, the patient went to [employer] and obtained an associate's degree in general studies in 2019. He was there for an extended period of time since he has always required to work, he says, and also he took a yearly hiatus when his father was sick with COPD and he needed to help him. MENTAL STATUS EXAM:  This is a male who looks his stated age. During this evaluation, there is no evidence of alcohol or any other type of drug-related signs of intoxication or withdrawal symptoms.   He is coherent, shows quality of continuity of associations without evidence of flight of ideas, pressure of speech, ideas of reference or influence or any hallucinations. The patient described himself as being depressed with suicidal thoughts; however, he is found able, willing and capable to talk to the staff if unable to control thoughts of self-harm. His insight and judgment are currently appropriate, and he is considered to have capacity. CLINICAL IMPRESSION:  AXIS I:  Major depressive disorder, single versus recurrent, without psychotic symptoms, severe. AXIS II:  Noncontributory. AXIS III:  Dyslipidemia by history. Prior history of obesity and prediabetes. Remote history of lead poisoning. TREATMENT PLAN:  1. The patient was admitted to the adult program, will be seen daily and will be referred to the groups within context of the program.  2.  Prescription for antidepressant therapy discussed with great deal of detail with the patient with the fact that he has not been compliant with Wellbutrin XL on a regular basis, the reason for which we proceeded to prescribe for him with Lexapro 10 mg a day with side effects and adverse effects associated with treatment with Lexapro clearly explained to the patient. 3.  For sleeping patterns, trazodone as a p.r.n., the same as for anxiety, Vistaril as needed also prescribed. 4.  I will obtain a lipid panel based upon the patient's history and also an A1c. Not clear he will require or not adjunctive treatment for his depression with a prescription for olanzapine or not. The possibility of doing so is going to be considered. 5.  The patient will have an inpatient  assigned to his care. ESTIMATED LENGTH OF STAY AND PROGNOSIS:  ELOS is 5 days. Prognosis is fairly good with treatment compliance.       Rony Pink MD, LFAPA      [id number]  D:  05/24/2022 11:20  T:  05/24/2022 16:08  JOB #:  [id number]    Behavioral Services  Medicare Certification Upon Admission    I certify that this patient's inpatient psychiatric hospital admission is medically necessary for:      [x] Treatment which could reasonably be expected to improve this patient's condition,       [] For diagnostic study;     AND     [x] The inpatient psychiatric services are provided while the individual is under the care of a physician and are included in the individualized plan of care. Estimated length of stay/service is 5 days    Plan for post-hospital care will include outpatient psychiatric follow-up.     Electronically signed by [unfilled] on 5/25/2022 at 12:00 PM

## 2022-05-24 NOTE — PROGRESS NOTES
Comprehensive Nutrition Assessment    Type and Reason for Visit: Initial,Positive nutrition screen    Nutrition Recommendations/Plan:   1. Update flavor preference of oral supplements and no pork-per pt request  2. Continue Ensure Enlive TID r/t poor PO intake PTA   3. Monitor PO intake, compliance of supplements/diet, and plan of care      Malnutrition Assessment:  Malnutrition Status: At risk for malnutrition (specify) (r/t poor PO intake PTA) (05/24/22 0957)      Nutrition History and Allergies: Past medical history of: Depression, Insomnia, HLD, prediabetes who was admitted for SI. Denies HI and hallucinations. Tox screen, EtOH and Rapid COVID all negative. NKFA. Weight history per chart review:  CBW: 05/21/22 : 97.1 kg (214 lb), 30 days: 04/04/22 : 101.3 kg (223 lb 6 oz), >365 days: 06/08/21 : 107.5 kg (237 lb). -4% significant change x 30 days. Nutrition Assessment:    Pt continues to be compliant with meds and meals per nurse note. Pt stated PTA his appetite decrease r/t environmental issues. Pt denies d/c/n/v nor food allergies. Pt reported he is compliant with meals and has good acceptance of Ensure Enlive-requested no strawberry. Per pt recall, UBW is appropriately 225 lbs. Nutrition Related Findings:    Last BM PTA. Pertinent Medications: Reviewed. Wound Type: None    Current Nutrition Intake & Therapies:  Average Meal Intake: %  Average Supplement Intake: %  ADULT DIET Regular  ADULT ORAL NUTRITION SUPPLEMENT Breakfast, Lunch, Dinner; Standard 4 oz    Anthropometric Measures:  Height: 5' 6\" (167.6 cm)  Ideal Body Weight (IBW): 142 lbs (65 kg)     Current Body Wt:  97.1 kg (214 lb 1.1 oz), 150.8 % IBW. Not specified  Current BMI (kg/m2): 34.6  Usual Body Weight: 102.1 kg (225 lb)  % Weight Change (Calculated): -4.9  BMI Category: Obese class 1 (BMI 30.0-34. 9)    Estimated Daily Nutrient Needs:  Energy Requirements Based On: Kcal/kg (20-22)  Weight Used for Energy Requirements: Current  Energy (kcal/day): 4291-3365  Weight Used for Protein Requirements: Current (0.8-1.0)  Protein (g/day): 78-97  Method Used for Fluid Requirements: 1 ml/kcal  Fluid (ml/day): 5262-0256    Nutrition Diagnosis:   No nutrition diagnosis at this time     Nutrition Interventions:   Food and/or Nutrient Delivery: Continue current diet,Continue oral nutrition supplement  Nutrition Education/Counseling: Education not indicated,No recommendations at this time  Coordination of Nutrition Care: Continue to monitor while inpatient,Interdisciplinary rounds  Plan of Care discussed with: RN and patient    Goals:     Goals: Meet at least 75% of estimated needs,by next RD assessment       Nutrition Monitoring and Evaluation:   Behavioral-Environmental Outcomes: None identified  Food/Nutrient Intake Outcomes: Food and nutrient intake,Supplement intake  Physical Signs/Symptoms Outcomes: Nutrition focused physical findings,Meal time behavior    Discharge Planning:    Continue oral nutrition supplement,Continue current diet    Arthur Molina MA, RDN, LD  Contact: [phone]

## 2022-05-24 NOTE — BH NOTES
The writer has observed the patient's behavior throughout this shift ([id number]). Patient appears to have a calm demeanor and a positive affect. Patient has been quite frequently engaging in groups, interacting with peers, playing games and watching television. Patient was observed talking with the medical doctor, the  and the nurse practitioner earlier during this shift. In addition, patient has not had any behavior issues but at times will isolated to room. Patient was incompliant with unit regulations and morning scheduled meds and meals that were offered. Patient discussed to the writer about being depressed on what is going on with ex-fialiciae and talked about committing suicide. Will continue to monitor the patient's safety (contact for safety ) and safety locations.

## 2022-05-24 NOTE — BSMART NOTE
ART THERAPY GROUP PROGRESS NOTE    PATIENT SCHEDULED FOR GROUP AT: [id number]    ATTENDANCE: Full    PARTICIPATION LEVEL:  Participates fully in the art process    ATTENTION LEVEL : Able to focus on task    FOCUS: Balance    SYMBOLIC & THEMATIC CONTENT AS NOTED IN IMAGERY: He was calm, alert, and presented with a bright affect. He was invested in the task at hand and actively participated in group discussion. He spoke fondly of his daughter and girlfriend, and mentioned several times he hopes to mend his relationship with his girlfriend. He was able to identify healthy means to manage stress and offered group members encouragement.

## 2022-05-24 NOTE — PROGRESS NOTES
Problem: Suicide  Goal: *STG: Remains safe in hospital  Description: AEB remaining safe and free from harm daily while hospitalized. Outcome: Progressing Towards Goal     Problem: Depressed Mood (Adult/Pediatric)  Goal: *STG: Participates in 1:1 therapy sessions  Description: AEB paricipating in at least 2 1:1 therapy sessions daily while hospitalized. Outcome: Progressing Towards Goal  Goal: *STG: Complies with medication therapy  Description: AEB taking all medication as prescribed daily while hospitalized. Outcome: Progressing Towards Goal     Soraya is meal and med compliant. He is free from falls and harm. Spends majority of time in day room talking to staff; hyper verbal and occasionally loud. Attended afternoon art group. No other concerns at this time.

## 2022-05-24 NOTE — BSMART NOTE
BH Biopsychosocial Assessment    Current Level of Psychosocial Functioning     [x]Independent  []Dependent  []Minimal Assist      Comments:      Psychosocial High Risk Factors (check all that apply)      []Unable to obtain meds                                                               []Chronic illness/pain    []Substance abuse   []Lack of Family Support   [x]Financial stress   []Isolation   [x]Inadequate Community Resources  [x]Suicide attempt(s)  []Not taking medications   []Victim of crime   []Developmental Delay  []Unable to manage personal needs    []Age 72 or older   []  Homeless  []Angeli transportation   []Readmission within 30 days  []Unemployment  [x]Traumatic Event      Psychiatric Advanced Directive: None     Family to involve in treatment: Pt. Has supportive family       Sexual Orientation:  Heterosexual     Patient Strengths: Pt. Is willing to seek help and is coopertive                                                                                                    Patient Barriers: Pt. Has poor insight, hopeless and helpless     Opiate education provided: NA        Safety plan: GAY discussed safety plan.  Pt. Currently denies ideations to harm self.         CMHC/MH history: Please refer to psychiatrist and  NP history and physical    Anxiety Disorder        Plan of Care: GAY encouraged pt to participate in the following activities as apart of treatment: medication management, group/individual therapies, family meetings, psycho education, treatment team meetings to assist with stabilization     Initial Discharge Plan:  Pt. Will require 3- 5 days of treatment     Clinical Summary:  Pt. is a 65-year-old male with history of  anxiety disorder. Pt. was hospitalized this facility after having thoughts to harm self-due to a recent break up with finance of 3 years, with who he shares a child with. GAY met with pt to discuss dc planning.  Pt expressed to feeling anxious and depressed following a breakup with his fiancé of 3 years. Pt expressed the finance told pt a month ago, she was unhappy. Pt stated he worked on some of his issues  to improve their relationship. Pt described how he ask for more hours at work, cooked more in the home and interacted more with their 3year-old daughter. Pt stated his fiancé advised him he will need to move out. Pt expressed he is having a hard time coping with the relationship break-up . The girlfriend has threatened to get a restarting order because she does not want the pt in the home. Pt. is hopeless, helpless with limited insight . SW discussed self -efficacy, coping with loss of the relationship, safety  and positive conflict resolution conflict resolution. Pt. is currently in counseling on-line with [facility] Pt. does need a counselor. SW will assist with exploring oupt mental health counseling.      Rj Mancera MA, LMHP-R

## 2022-05-24 NOTE — PROGRESS NOTES
Patient admitted to [facility] unit for depression and suicidal ideation on a voluntary status. Patient is currently alert and oriented x 4 and he is able to recall events that led to admission. Anish reported, \"I have been going through things with my now ex-fiancee. \"  Patient reported that he has attempted suicide once in the past when he was 7years old. Pt denied any current thoughts of suicide. \"Now that I'm here I feel better. \"  Pt reported that his current stressors are:  recent breakup with ana, possibility of being homeless. Patient reported that he is a night owl and only sleeps about 4-5 hours per night. He requested PRN medication for sleep and anxiety. Patient's current mental status is \"I feel better now. \"  PRN medication given per pt request.  Education provided on medication given. Patient was oriented to unit and rules. Patient was checked for contraband. Patient placed on suicide precautions where rounds will be monitored every 15 minutes for safety. Opportunity for questions provided. All needs assessed. RN will initiate, develop, implement, review or revise treatment plan.

## 2022-05-25 LAB
CHOLEST SERPL-MCNC: 163 MG/DL
HBA1C MFR BLD: 5.4 % (ref 4.2–5.6)
HDLC SERPL-MCNC: 40 MG/DL (ref 40–60)
HDLC SERPL: 4.1 {RATIO} (ref 0–5)
LDLC SERPL CALC-MCNC: 92.4 MG/DL (ref 0–100)
LIPID PROFILE,FLP: ABNORMAL
TRIGL SERPL-MCNC: 153 MG/DL (ref ?–150)
TSH SERPL DL<=0.05 MIU/L-ACNC: 1.53 UIU/ML (ref 0.36–3.74)
VLDLC SERPL CALC-MCNC: 30.6 MG/DL

## 2022-05-25 PROCEDURE — 74011250637 HC RX REV CODE- 250/637: Performed by: STUDENT IN AN ORGANIZED HEALTH CARE EDUCATION/TRAINING PROGRAM

## 2022-05-25 PROCEDURE — 80061 LIPID PANEL: CPT

## 2022-05-25 PROCEDURE — 83036 HEMOGLOBIN GLYCOSYLATED A1C: CPT

## 2022-05-25 PROCEDURE — 99232 SBSQ HOSP IP/OBS MODERATE 35: CPT | Performed by: PSYCHIATRY & NEUROLOGY

## 2022-05-25 PROCEDURE — 65220000003 HC RM SEMIPRIVATE PSYCH

## 2022-05-25 PROCEDURE — 84443 ASSAY THYROID STIM HORMONE: CPT

## 2022-05-25 PROCEDURE — 36415 COLL VENOUS BLD VENIPUNCTURE: CPT

## 2022-05-25 PROCEDURE — 74011250637 HC RX REV CODE- 250/637: Performed by: PSYCHIATRY & NEUROLOGY

## 2022-05-25 RX ADMIN — ATORVASTATIN CALCIUM 20 MG: 20 TABLET, FILM COATED ORAL at 20:22

## 2022-05-25 RX ADMIN — TRAZODONE HYDROCHLORIDE 50 MG: 50 TABLET ORAL at 20:22

## 2022-05-25 RX ADMIN — ESCITALOPRAM OXALATE 10 MG: 10 TABLET ORAL at 08:07

## 2022-05-25 NOTE — BSMART NOTE
ART THERAPY GROUP PROGRESS NOTE    PATIENT SCHEDULED FOR GROUP AT: 200    ATTENDANCE: Full    PARTICIPATION LEVEL: Participates fully in the art process    ATTENTION LEVEL: Able to focus on task    FOCUS: Goals    SYMBOLIC & THEMATIC CONTENT AS NOTED IN IMAGERY: He was calm, compliant, and alert. He was invested in the task at hand and his approach to task was planned-out and controlled. He identified that he wants to let go of \"self-hate,\" and described himself with a very poor self concept. He claimed that he wants to work on being more confidant and group discussed different means to eliecer one's self esteem and recognize self worth.

## 2022-05-25 NOTE — BH NOTES
Patient accepting meals. Patient attending all scheduled groups this shift. Patient participating fully in groups. Patient pleasant and calm this shift. Patient using patient phone. Patient not displaying any issues at this time.

## 2022-05-25 NOTE — BSMART NOTE
ART THERAPY GROUP PROGRESS NOTE    PATIENT SCHEDULED FOR GROUP AT: 10:00    ATTENDANCE: Full    PARTICIPATION LEVEL: Participates fully in the art process    ATTENTION LEVEL: Able to focus on task    FOCUS: Mindfulness     SYMBOLIC & THEMATIC CONTENT AS NOTED IN IMAGERY: he was calm, compliant, and invested in the task at hand. He spoke about being his \"own worst critic\" and how he needs to give himself credit and view \"mistakes\" as an opportunity for growth vs dwell on failure.

## 2022-05-25 NOTE — BSMART NOTE
Pt. is a 79-year-old male with history of  anxiety disorder. Pt. was hospitalized this facility after having thoughts to harm self-due to a recent break up with finance of 3 years, with who he shares a child with.       Pt.'s case was discussed. SW Contact:  SW met with pt to discuss dc planning. Pt. Expressed to feeling a little better. Pt states he continues to have racing thoughts at times but denies ideations of self harm. SW provided pt with mental health education, discussed safety plan and mental health skill building. Pt. Appears less anxious , depressed and has fair insight. SW will continue to provide the pt with supports. SW will assist pt with dc planning.        Funmi Reddy MA, LMHP-R

## 2022-05-25 NOTE — PROGRESS NOTES
[address]  Inpatient Progress Note     Date of Service: 05/25/22  Hospital Day: 2     Subjective/Interval History   05/25/22    Treatment Team Notes:  Notes reviewed and/or discussed and report that Jason Salinas is a patient recently admitted to the facility, attention invited to the dictated admission note which is self-explanatory. Patient interview: Jason Salinas was interviewed by this writer today. The patient is adapting to the adult program fairly well. He has begun to participate in in the group therapy sessions, and has been rather open with the staff about his depression, and its severity. Due to the fact that his treatment compliance with bupropion XL was rather poor, this resulting on the patient not taking his prescription regularly, we were able to stop the antidepressant abruptly and began treatment with Lexapro. Obviously it is too soon to say that the patient will be able to respond to Lexapro or not however at least the initial doses were well-tolerated. So we will continue the same for now. Objective     Visit Vitals  BP (!) 140/98   Pulse 64   Temp 97 °F (36.1 °C)   Resp 20   Ht 5' 6\" (1.676 m)   Wt 97.1 kg (214 lb)   SpO2 98%   BMI 34.54 kg/m²     Blood pressure was noted to be elevated above.   The rest of the vitals are normal    Recent Results (from the past 24 hour(s))   LIPID PANEL    Collection Time: 05/25/22  6:55 AM   Result Value Ref Range    LIPID PROFILE          Cholesterol, total 163 <200 MG/DL    Triglyceride 153 (H) <150 MG/DL    HDL Cholesterol 40 40 - 60 MG/DL    LDL, calculated 92.4 0 - 100 MG/DL    VLDL, calculated 30.6 MG/DL    CHOL/HDL Ratio 4.1 0 - 5.0     TSH 3RD GENERATION    Collection Time: 05/25/22  6:55 AM   Result Value Ref Range    TSH 1.53 0.36 - 3.74 uIU/mL   HEMOGLOBIN A1C W/O EAG    Collection Time: 05/25/22  6:55 AM   Result Value Ref Range    Hemoglobin A1c 5.4 4.2 - 5.6 %     Above results noted    Mental Status Examination     Appearance/Hygiene 31 y.o. BLACK/ male  Hygiene: Fair   Behavior/Social Relatedness Appropriate   Musculoskeletal Gait/Station: appropriate  Tone (flaccid, cogwheeling, spastic): not assessed  Psychomotor (hyperkinetic, hypokinetic): calm   Involuntary movements (tics, dyskinesias, akathisa, stereotypies): none   Speech   Rate, rhythm, volume, fluency and articulation are appropriate   Mood   depressed   Affect    congruent   Thought Process Linear and goal directed   Thought Content and Perceptual Disturbances  suicidal thoughts are less intense. Able to CFS while in the hospital.  Denies auditory and visual hallucinations, ideas of reference or influence or any delusional thoughts   Sensorium and Cognition  Grossly intact   Insight  fair   Judgment  fair        Assessment/Plan      Psychiatric Diagnoses:   Patient Active Problem List   Diagnosis Code    BMI 38.0-38.9,adult Z68.38    Dyslipidemia E78.5    Severe obesity (BMI 35.0-39. 9) E66.01    Primary insomnia F51.01    Depression, major, recurrent, mild (HCC) F33.0    Depression F32. A       Medical Diagnoses: Major depressive disorder without psychotic symptoms single versus recurrent, history of dyslipidemia, resolving. History of prediabetes, resolved. Psychosocial and contextual factors: See admission note    Level of impairment/disability: TBD     1. Treatment with Lexapro will continue. No evidence of medications related side effects noted. The patient described being able to sleep well however did require treatment with trazodone and Vistaril to help with his insomnia. 2.  Reviewed instructions, risks, benefits and side effects of medications  3.   Disposition/Discharge Date: self-care/home, TBD    Melody Goss MD, [address] [place] [address]  Psychiatry

## 2022-05-25 NOTE — PROGRESS NOTES
Problem: Depressed Mood (Adult/Pediatric)  Goal: *STG: Complies with medication therapy  Description: AEB taking all medication as prescribed daily while hospitalized. Outcome: Progressing Towards Goal       Problem: Falls - Risk of  Goal: *Absence of Falls  Description: Document Boni Sim Fall Risk and appropriate interventions in the flowsheet. AEB remaining free of falls and wearing skid proof socks daily while hospitalized. Outcome: Progressing Towards Goal  Note: Fall Risk Interventions:  Medication Interventions: Teach patient to arise slowly      Patient has been in day room most of evening shift. Patient has eaten meals and snacks and has not required PRN medications this shift. Patient has performed ADL's and has been free from falls and harm. Patient was given opportunity to retrieve phone numbers from cell phone. Patient has been appropriate with staff and peer interactions. Will continue to monitor and provide interventions as appropriate.

## 2022-05-25 NOTE — GROUP NOTE
KENDELL  GROUP DOCUMENTATION INDIVIDUAL                                                                          Group Therapy Note    Date: 5/24/2022    Group Start Time: 2000  Group End Time: 2030  Group Topic: Medication    [facility] [place] 1 ADULT CHEM DEP    Mary Yu RN    IP [address] GROUP DOCUMENTATION GROUP    Group Therapy Note    Attendees: 11         Attendance: Attended    Patient's Goal:  Understanding the importance of maintaining medication compliance even when feeling well. Interventions/techniques: Informed    Follows Directions: Followed directions    Interactions: Interacted appropriately    Mental Status: Flat    Behavior/appearance: Cooperative    Goals Achieved: Able to receive feedback      Additional Notes:  Pt has been in day area sitting quietly/isolated to self and sometimes pacing the hallway. Verbalized understanding of medication being provided. Opportunity for questions provided. Remains medication compliant. PRN mediaction provided for signs of anxiety and reported restlessness. Pt is focused on trying to repair relationship. Pt was encouraged to spend this time to focus on his self and alternative plans, just in case his ex is not ready to receive him. Pt reported that he was able to talk to his mom and  for possible places to live until he can get back on his feet. Will continue to monitor and support as needed.     Todd Edwards RN

## 2022-05-26 PROBLEM — F32.2 MAJOR DEPRESSIVE DISORDER, SINGLE EPISODE, SEVERE WITH ANXIOUS DISTRESS (HCC): Status: ACTIVE | Noted: 2022-05-26

## 2022-05-26 PROBLEM — F33.0 DEPRESSION, MAJOR, RECURRENT, MILD (HCC): Status: RESOLVED | Noted: 2020-10-06 | Resolved: 2022-05-26

## 2022-05-26 PROBLEM — F32.A DEPRESSION: Status: RESOLVED | Noted: 2022-05-23 | Resolved: 2022-05-26

## 2022-05-26 PROCEDURE — 99232 SBSQ HOSP IP/OBS MODERATE 35: CPT | Performed by: PSYCHIATRY & NEUROLOGY

## 2022-05-26 PROCEDURE — 74011250637 HC RX REV CODE- 250/637: Performed by: PSYCHIATRY & NEUROLOGY

## 2022-05-26 PROCEDURE — 74011250637 HC RX REV CODE- 250/637: Performed by: STUDENT IN AN ORGANIZED HEALTH CARE EDUCATION/TRAINING PROGRAM

## 2022-05-26 PROCEDURE — 65220000003 HC RM SEMIPRIVATE PSYCH

## 2022-05-26 RX ADMIN — ATORVASTATIN CALCIUM 20 MG: 20 TABLET, FILM COATED ORAL at 20:19

## 2022-05-26 RX ADMIN — ESCITALOPRAM OXALATE 10 MG: 10 TABLET ORAL at 08:44

## 2022-05-26 RX ADMIN — TRAZODONE HYDROCHLORIDE 50 MG: 50 TABLET ORAL at 20:19

## 2022-05-26 NOTE — BH NOTES
Patient accepting meals. Patient communicating with doctor. Patient using patient phone to contact family throughout the day. Patient communicating with staff throughout the day about personal issues. Patient receptive to feedback that staff was providing. Patient pleasant and calm during the shift.

## 2022-05-26 NOTE — PROGRESS NOTES
[address] [facility] [address]  Inpatient Progress Note     Date of Service: 05/26/22  Hospital Day: 3     Subjective/Interval History   05/26/22    Treatment Team Notes:  Notes reviewed and/or discussed and report that Missy Shea is a patient with a history of depression recently admitted, and showing very positive treatment response. Patient interview: Missy Shea was interviewed by this writer today. During our session today, the patient described the improvement associated to current hospitalization and specifically gaining insight into his difficulties, and becoming less helpless and hopeless as a result. The patient is currently denying any thoughts of self-harm, is agreeable to outpatient treatment, with the appropriate referrals to be made. If current improvement continues, we are hopeful to be able to discharge tomorrow. Objective     Visit Vitals  /77   Pulse 63   Temp 97.3 °F (36.3 °C)   Resp 18   Ht 5' 6\" (1.676 m)   Wt 97.1 kg (214 lb)   SpO2 99%   BMI 34.54 kg/m²     Vitals are stable    No results found for this or any previous visit (from the past 24 hour(s)). Mental Status Examination     Appearance/Hygiene 31 y.o.  BLACK/ male  Hygiene: Improved   Behavior/Social Relatedness Appropriate   Musculoskeletal Gait/Station: appropriate  Tone (flaccid, cogwheeling, spastic): not assessed  Psychomotor (hyperkinetic, hypokinetic): calm   Involuntary movements (tics, dyskinesias, akathisa, stereotypies): none   Speech   Rate, rhythm, volume, fluency and articulation are appropriate   Mood   depression is improving   Affect    congruent   Thought Process Linear and goal directed   Thought Content and Perceptual Disturbances Denies self-injurious behavior (SIB), suicidal ideation (SI), aggressive behavior or homicidal ideation (HI)    Denies auditory and visual hallucinations   Sensorium and Cognition  Grossly intact   Insight  improving   Judgment improving        Assessment/Plan      Psychiatric Diagnoses:   Patient Active Problem List   Diagnosis Code    BMI 38.0-38.9,adult Z68.38    Dyslipidemia E78.5    Severe obesity (BMI 35.0-39. 9) E66.01    Primary insomnia F51.01    Major depressive disorder, single episode, severe with anxious distress (HCC) F32.2       Medical Diagnoses: Same    Psychosocial and contextual factors: Same    Level of impairment/disability: TBD    1. Medications will continue to be the same. Obviously current improvement is not medications induced, however the positive effects of current inpatient treatment is appreciated. The patient is in agreement to be referred to outpatient treatment, which we will proceed to do so tomorrow. Discharge may follow again if stable. 2.  Reviewed instructions, risks, benefits and side effects of medications  3. Disposition/Discharge Date: self-care/home, possibly tomorrow.     Orly Moulton MD, [address] [place] [address]  Psychiatry

## 2022-05-26 NOTE — GROUP NOTE
KEN[facility]  GROUP DOCUMENTATION INDIVIDUAL                                                                          Group Therapy Note    Date: 5/25/2022    Group Start Time: 1940  Group End Time: 2000  Group Topic: Medication    [facility] 1 ADULT CHEM DEP    Aisha Ochoa RN    IP [address] GROUP DOCUMENTATION GROUP    Group Therapy Note    Attendees: 0         Attendance: Did not attend    Additional Notes:  Patient is on the phone     Aye Shipley RN

## 2022-05-26 NOTE — PROGRESS NOTES
Problem: Suicide  Goal: *STG: Remains safe in hospital  Description: AEB remaining safe and free from harm daily while hospitalized. Outcome: Progressing Towards Goal     Problem: Depressed Mood (Adult/Pediatric)  Goal: *STG: Complies with medication therapy  Description: AEB taking all medication as prescribed daily while hospitalized. Outcome: Progressing Towards Goal     Problem: Falls - Risk of  Goal: *Absence of Falls  Description: Document Sunny Matt Fall Risk and appropriate interventions in the flowsheet. AEB remaining free of falls and wearing skid proof socks daily while hospitalized. Outcome: Progressing Towards Goal  Note: Fall Risk Interventions:            Medication Interventions: Teach patient to arise slowly       Pt presents with bright affect, anxious mood, flight of ideas, pressured speech. Pt appears to be fixated on his relationship with his ex-girlfriend. Pt denies SI/HI at this time. Pt is medication compliant. Will continue to monitor.

## 2022-05-26 NOTE — PROGRESS NOTES
Problem: Depressed Mood (Adult/Pediatric)  Goal: *STG: Participates in 1:1 therapy sessions  Description: AEB paricipating in at least 2 1:1 therapy sessions daily while hospitalized. Outcome: Progressing Towards Goal     Problem: Depressed Mood (Adult/Pediatric)  Goal: *STG: Attends activities and groups  Description: AEB attending at least 3-4 groups daily while hospitalized. Outcome: Progressing Towards Goal       Patient has been in day room most of day and evening shifts. Patient has attended all groups and activities and has been active participant. Patient has been compliant with scheduled medications and has not required PRN medications this shift. Patient has eaten all meals and snacks and has spoken to mother and sister via telephone this shift. Patient performed ADL's without prompting and has been free from falls and harm. Will continue to monitor and provide interventions as appropriate.

## 2022-05-26 NOTE — BH NOTES
Patient ws in the day area when staff. Patient stated that he feel good about his life now. Patient stated he is going to fight for custody of his child. Patient is pleasant in the day area with no issues. Patient ate dinner and had a snack. Patient took nighttime medications. Patient will be monitored for safety.

## 2022-05-26 NOTE — BSMART NOTE
Pt. is a 76-year-old male with history of  anxiety disorder. Pt. was hospitalized this facility after having thoughts to harm self-due to a recent break up with finance of 3 years, with who he shares a child with.         Pt.'s case was discussed. Pt. Is making progress. SW Contact:  SW met with pt to discuss dc planning. \" I feel great\". Pt. Appears to have a brighter affect. Pt reports he has been communicating with his family. Pt states the family has been very supportive. Pt states the family has offered to allow pt to move in with them for continued emotional support. SW discussed continued coping strategies, safety plan and aftercare. Pt. expressed to feeling better. Pt states he plans to move in with his sister. Pt. Is willing to have mental health skill building services  Pt. 's mood counties to improve. SW will continue to provide pt with support towards dc planning.          Paul Sterling MA, LMHP-R

## 2022-05-27 VITALS
HEART RATE: 62 BPM | OXYGEN SATURATION: 98 % | WEIGHT: 214 LBS | DIASTOLIC BLOOD PRESSURE: 72 MMHG | SYSTOLIC BLOOD PRESSURE: 112 MMHG | BODY MASS INDEX: 34.39 KG/M2 | HEIGHT: 66 IN | RESPIRATION RATE: 18 BRPM | TEMPERATURE: 98.2 F

## 2022-05-27 PROCEDURE — 99238 HOSP IP/OBS DSCHRG MGMT 30/<: CPT | Performed by: PSYCHIATRY & NEUROLOGY

## 2022-05-27 PROCEDURE — 74011250637 HC RX REV CODE- 250/637: Performed by: PSYCHIATRY & NEUROLOGY

## 2022-05-27 RX ORDER — TRAZODONE HYDROCHLORIDE 50 MG/1
50 TABLET ORAL
Qty: 15 TABLET | Refills: 1 | Status: SHIPPED | OUTPATIENT
Start: 2022-05-27

## 2022-05-27 RX ORDER — ESCITALOPRAM OXALATE 10 MG/1
10 TABLET ORAL DAILY
Qty: 15 TABLET | Refills: 1 | Status: SHIPPED | OUTPATIENT
Start: 2022-05-28 | End: 2022-10-04

## 2022-05-27 RX ORDER — HYDROXYZINE PAMOATE 50 MG/1
50 CAPSULE ORAL
Qty: 20 CAPSULE | Refills: 1 | Status: SHIPPED | OUTPATIENT
Start: 2022-05-27 | End: 2022-10-04

## 2022-05-27 RX ADMIN — HYDROXYZINE PAMOATE 50 MG: 50 CAPSULE ORAL at 08:09

## 2022-05-27 RX ADMIN — ESCITALOPRAM OXALATE 10 MG: 10 TABLET ORAL at 08:09

## 2022-05-27 NOTE — BSMART NOTE
Pt. is a 43-year-old male with history of  anxiety disorder. Pt. was hospitalized this facility after having thoughts to harm self-due to a recent break up with finance of 3 years, with who he shares a child with.     Pt.' case was discussed  Pt will be dc today. Sw met with pt to discuss dc plan. Pt denies ideations and halluincations. Pt plans to stay with sister in [place], [place] . Pt was encouraged to follow-up with  upcoming tele health appointment with Kamran Flowers NP for medication management. Pt. Will be referred for counseling at the same practice  In [facility]. Pt. Will be followed in the community by [employer]  [phone]. Pt. 's family will pick hm up at NH.     Caprice Adam MA, LMHP-R

## 2022-05-27 NOTE — BH NOTES
[facility] RN Discharge    The discharge information has been reviewed with the patient. The patient verbalized understanding. Pts belongings were returned, left with MHT.

## 2022-05-29 NOTE — DISCHARGE SUMMARY
[facility]    Name:  Paul Gaytan  MR#:   [id number]  :  1995  ACCOUNT #:  [de-identified]  ADMIT DATE:  2022  DISCHARGE DATE:  2022      SIGNIFICANT FINDINGS:  History and physical exam was performed shortly after the patient was admitted to the facility, attention invited to this document, a place where the reasons for which the patient consulted with [facility], findings upon his being examined there and so the basis for which he required to be psychiatrically admitted are clearly indicated. For the purpose of this discharge summary, the reader is advised that the patient came to the emergency room on 2022, was admitted the same day to the Adult CD program, a place where shortly thereafter he met with me. In the emergency room, the patient described a history of being depressed and becoming suicidal \"an hour before arrival to the emergency department since his significant other had broken the relationship with him. \"  The patient stated that after finding this out, he started to have severe depression with thoughts of harming himself such as jumping in front of a car or slitting his wrists. The patient did not want to go home to be alone thinking that he may harm himself. The case was then presented to the physician on-call with the patient being admitted to my service. At the time of the patient's admission, he described a history of his consulting with a provider in the [place] area. He has been prescribed with Wellbutrin XL, which he was taking on a very irregular basis, during the last couple of weeks that preceded his admission having him taking only 5 days out of the 2 weeks prescribed. It appeared that his primary care physician Dr. Rom Longoria has been able to continue the prescriptions; however, the patient's compliance was very poor as stated. The patient described being involved with his girlfriend for 4 years.   He described also he is having a child who is 3year-old and described that he is trying to be calm and much more supportive individual and trying to take care of the most issues that his girlfriend had indicated were a problem with their relationship. The patient works for [employer] as a , goes to work very regularly and is the one who has provided with economical support to his girlfriend, to their child, the same as to his girlfriend's sister who apparently \"was with them at all times. \"  During the initial evaluation, he was found to be rather helpless, showed no evidence of psychotic symptoms; however, he had concerns raised about his being suicidal as indicated. Physical exam at the emergency room was that of a patient with a blood pressure 127/85, pulse 63, respirations 18, height 5 feet 6 inches, weight 214 pounds, BMI of 34.54. Oxygen saturation rate 98% on room air. Physical exam was basically negative. With his psychiatric examination, he only described depressed mood with thoughts of suicide. Multiple labs were performed in the emergency department including a CBC with differential that showed completely normal test results. BMP showed normal results with normal electrolytes, blood sugar 83, normal kidney functioning tests. Liver function tests on 05/16/2022 were completely normal by the way with alcohol levels below 3. Urine drug screen was negative. COVID test by SARS showed negative results for the COVID virus, the same as influenza A and influenza B viruses. The patient had consulted with emergency room on 05/16/2022, at which time, a chest x-ray was performed that showed negative results. This is the time in which he also had a CMP with the above-mentioned liver function tests obtained then. COURSE DURING HOSPITALIZATION AND TREATMENT:  The patient was admitted to the adult program, a place where he remained until the day of discharge.   He was seen on daily individual psychiatric basis and was referred to the groups within context of the program.  During the initial session, the possibility of prescribing him with a different antidepressant was brought to his attention. Treatment with escitalopram (Lexapro), was started with the patient showing what appears to be a very positive treatment response as indicated. He was found to be in general rather compliant with treatment and became very much in agreement to be able to be referred for further outpatient care as indicated. During the patient's stay, he was able to consider the ending of his relationship with his girlfriend. He had indicated that he had been questioning her behaviors with his also being advised of his girlfriend that she had just recently miscarriaged. The patient did not have any questions about this being his child as he indicated to me. The patient, who has a history of dyslipidemia, was maintained on treatment with atorvastatin 20 mg at bedtime and for anxiety, he was prescribed with Vistaril 50 mg three times a day as needed which he took occasionally. With this combination of treatment approaches, by the day of discharge, the patient was found to be much more stable. He denied any thoughts of harming self and/or others and was in agreement to continue with further outpatient treatment. The patient was considered to have capacity at the time of discharge with his being strongly supportive of the idea to continue with further outpatient care as indicated. During this hospitalization, an H and P was performed by Farzana Forde PA-C, that confirmed the findings upon the patient's examination in the emergency department. During this admission, we did proceed to obtain a lipid panel that showed triglycerides of 153, total cholesterol 163, HDL cholesterol 40, cholesterol-HDL ratio of 4.1 and LDL of 92.4. A1c was found to be normal at 5.4%.   For completeness, a TSH was obtained which showed also normal results at 1.53 International Units/mL. CONDITION UPON DISCHARGE:  Not suicidal or homicidal, showing no evidence of psychosis, cognition intact, and agreeable to outpatient treatment with good prognosis if he continues with further outpatient care being noted. FINAL DIAGNOSES:  AXIS I:  Major depressive disorder, recurrent episode without psychosis. AXIS II:  Noncontributory. AXIS III:  Dyslipidemia by history, under good control. Obesity by history. Prior history of prediabetes with normal A1c test results. Remote history of lead poisoning. DISPOSITION:  The patient was discharged with outpatient treatment referrals, attention invited to his 's note which is self-explanatory. He was suggested to continue with further outpatient treatment with Dr. Paz Ballard as prior to admission. PRESCRIPTIONS UPON DISCHARGE:  The patient was provided with the following prescriptions for 2 weeks with one refill includin. Lexapro 10 mg tablets, #15, to take one every morning. 2.  Trazodone 50 mg tablets, #15, one refill, to take one at night at bedtime. 3.  Vistaril, a total of 20 capsules with one refill, to take one three times a day as needed also provided. 4.  The patient was suggested to continue treatment with Lipitor 20 mg every night as prescribed by Dr. Paz Ballard, his primary care physician. PROGNOSIS:  Good with outpatient treatment compliance.       Corey Golden MD, LFAPA      [id number]  D:  2022 13:47  T:  2022 5:44  JOB #:  [id number]

## 2022-10-03 NOTE — PROGRESS NOTES
1. \"Have you been to the ER, urgent care clinic since your last visit? Hospitalized since your last visit? \" Yes Where: [facility] in May for psych issues     2. \"Have you seen or consulted any other health care providers outside of the [address] [place] [address] since your last visit? \" Yes Where: Psych-Dr. Beau Barrios in [place]       3. For patients aged 39-70: Has the patient had a colonoscopy / FIT/ Cologuard? NA - based on age      If the patient is female:    4. For patients aged 41-77: Has the patient had a mammogram within the past 2 years? NA - based on age or sex      11. For patients aged 21-65: Has the patient had a pap smear? NA - based on age or sex    Physician order obtained. Patient completed adult immunization consent form. Allergies, contraindications and recommendations reviewed with patient. Seasonal influenza vaccine administered IM right deltoid. Patient tolerated well. Patient remained in office for 15 minutes after injection and no adverse reactions were noted.         3 most recent PHQ Screens 10/4/2022   PHQ Not Done -   Little interest or pleasure in doing things Several days   Feeling down, depressed, irritable, or hopeless More than half the days   Total Score PHQ 2 3   Trouble falling or staying asleep, or sleeping too much Several days   Feeling tired or having little energy Nearly every day   Poor appetite, weight loss, or overeating Several days   Feeling bad about yourself - or that you are a failure or have let yourself or your family down More than half the days   Trouble concentrating on things such as school, work, reading, or watching TV More than half the days   Moving or speaking so slowly that other people could have noticed; or the opposite being so fidgety that others notice Nearly every day   Thoughts of being better off dead, or hurting yourself in some way More than half the days   PHQ 9 Score 17   How difficult have these problems made it for you to do your work, take care of your home and get along with others Somewhat difficult

## 2022-10-04 ENCOUNTER — OFFICE VISIT (OUTPATIENT)
Dept: FAMILY MEDICINE CLINIC | Age: 27
End: 2022-10-04
Payer: MEDICAID

## 2022-10-04 VITALS
DIASTOLIC BLOOD PRESSURE: 84 MMHG | HEIGHT: 66 IN | SYSTOLIC BLOOD PRESSURE: 122 MMHG | TEMPERATURE: 97.8 F | BODY MASS INDEX: 28.12 KG/M2 | RESPIRATION RATE: 14 BRPM | WEIGHT: 175 LBS | OXYGEN SATURATION: 99 % | HEART RATE: 65 BPM

## 2022-10-04 DIAGNOSIS — Z23 ENCOUNTER FOR IMMUNIZATION: ICD-10-CM

## 2022-10-04 DIAGNOSIS — R63.4 WEIGHT LOSS: ICD-10-CM

## 2022-10-04 DIAGNOSIS — F33.0 DEPRESSION, MAJOR, RECURRENT, MILD (HCC): ICD-10-CM

## 2022-10-04 DIAGNOSIS — E78.5 DYSLIPIDEMIA: ICD-10-CM

## 2022-10-04 DIAGNOSIS — K29.70 GASTRITIS, PRESENCE OF BLEEDING UNSPECIFIED, UNSPECIFIED CHRONICITY, UNSPECIFIED GASTRITIS TYPE: ICD-10-CM

## 2022-10-04 DIAGNOSIS — R73.03 PREDIABETES: ICD-10-CM

## 2022-10-04 DIAGNOSIS — M25.512 ARTHRALGIA OF LEFT ACROMIOCLAVICULAR JOINT: ICD-10-CM

## 2022-10-04 DIAGNOSIS — Z00.00 WELL ADULT EXAM: Primary | ICD-10-CM

## 2022-10-04 PROCEDURE — 90686 IIV4 VACC NO PRSV 0.5 ML IM: CPT | Performed by: FAMILY MEDICINE

## 2022-10-04 PROCEDURE — 99395 PREV VISIT EST AGE 18-39: CPT | Performed by: FAMILY MEDICINE

## 2022-10-04 RX ORDER — ATORVASTATIN CALCIUM 20 MG/1
20 TABLET, FILM COATED ORAL
Qty: 30 TABLET | Refills: 5 | Status: SHIPPED | OUTPATIENT
Start: 2022-10-04

## 2022-10-04 RX ORDER — BUPROPION HYDROCHLORIDE 150 MG/1
150 TABLET ORAL DAILY
COMMUNITY
Start: 2022-08-05 | End: 2022-10-04 | Stop reason: SDUPTHER

## 2022-10-04 RX ORDER — BUPROPION HYDROCHLORIDE 150 MG/1
150 TABLET ORAL DAILY
Qty: 7 TABLET | Refills: 0 | Status: SHIPPED | OUTPATIENT
Start: 2022-10-04

## 2022-10-04 RX ORDER — OMEPRAZOLE 20 MG/1
20 CAPSULE, DELAYED RELEASE ORAL DAILY
Qty: 30 CAPSULE | Refills: 1 | Status: SHIPPED | OUTPATIENT
Start: 2022-10-04

## 2022-10-04 NOTE — PROGRESS NOTES
Chief Complaint   Patient presents with    Shoulder Pain     Left shoulder pain and \"cracking\"    Physical     Subjective:   Leatha Knight is a 28 y.o. male presenting for his annual checkup. ROS:  Feeling well. No dyspnea or chest pain on exertion. No abdominal pain, change in bowel habits, black or bloody stools. No urinary tract or prostatic symptoms. No neurological complaints. Specific concerns today:     Had old injury to left shoulder 6-7 years ago. Has left AC joint prominence. Has pain in there with cross body motions since then. Depression severe since break -up with mother of his young daughter. He is under the care of psych and seeing them Fri. He was admitted to a psych facility. He ran out of wellbutrin last week. He says he is not suicidal.  He was 2 months ago but he talked himself out of cutting his wrist.  He is in counseling as well. He has lost weight as well as a result of all of this. Current Outpatient Medications   Medication Sig Dispense Refill    buPROPion XL (WELLBUTRIN XL) 150 mg tablet Take 1 Tablet by mouth daily. 7 Tablet 0    atorvastatin (LIPITOR) 20 mg tablet Take 1 Tablet by mouth nightly. Indications: excessive fat in the blood 30 Tablet 5    traZODone (DESYREL) 50 mg tablet Take 1 Tablet by mouth nightly as needed for Sleep. Indications: insomnia associated with depression 15 Tablet 1     No Known Allergies  Past Medical History:   Diagnosis Date    Depression     Hypercholesterolemia     Lead poisoning childhood    Medically noncompliant     2021 - lost to follow-up, did not continue agreed upon careplan with treatment of high chol and depression     Obesity     Prediabetes      History reviewed. No pertinent surgical history.   Family History   Problem Relation Age of Onset    Diabetes Mother     Heart Failure Mother     Hypertension Mother     COPD Father     Diabetes Brother      Social History     Tobacco Use    Smoking status: Never    Smokeless tobacco: Never   Substance Use Topics    Alcohol use: No      Objective:     Visit Vitals  /84 (BP 1 Location: Left upper arm, BP Patient Position: Sitting, BP Cuff Size: Large adult)   Pulse 65   Temp 97.8 °F (36.6 °C) (Temporal)   Resp 14   Ht 5' 6\" (1.676 m)   Wt 175 lb (79.4 kg)   SpO2 99%   BMI 28.25 kg/m²     The patient appears well, alert, oriented x 3, in no distress. ENT normal.  Neck supple. No adenopathy or thyromegaly. TOSHIA. Lungs are clear, good air entry, no wheezes, rhonchi or rales. S1 and S2 normal, no murmurs, regular rate and rhythm. Abdomen is soft without tenderness, guarding, mass or organomegaly.  exam: counseled on self testicular exam.  Extremities show no edema. Left shoulder - full ROM. Cross arm adduction is painful. There is no pain or weakness on RTC testing. There is negative impingement. AC joint is more prominent on this side. Neurological is normal without focal findings. Psych: normal affect. Mood good. Oriented x 3. Judgement and insight intact. Assessment/Plan:       ICD-10-CM ICD-9-CM    1. Well adult exam  Z00.00 V70.0       2. Depression, major, recurrent, mild (HCC)  F33.0 296.31       3. Prediabetes  R73.03 790.29 CBC WITH AUTOMATED DIFF      METABOLIC PANEL, COMPREHENSIVE      HEMOGLOBIN A1C W/O EAG      4. Dyslipidemia  E78.5 272.4 atorvastatin (LIPITOR) 20 mg tablet      METABOLIC PANEL, COMPREHENSIVE      LIPID PANEL      5. Weight loss  R63.4 783.21       6. Arthralgia of left acromioclavicular joint  M25.512 719.41 REFERRAL TO ORTHOPEDICS        Age and sex specific counseling. Refer to orthopedics to discuss treatment options for shoulder. Fasting labs ordered. Start back on lipitor for hypercholesterolemia. Start back on Wellbutrin for depression and follow-up with psych which he has coming up. All chart history elements were reviewed by me at the time of the visit even though marked at time of note closure.  Patient understands our medical plan. Patient has provided input and agrees with goals. Alternatives have been explained and offered. All questions answered. The patient is to call if condition worsens or fails to improve. Follow-up and Dispositions    Return in about 6 months (around 4/4/2023) for routine care. Fasting labs due 3-7 days prior to appointment.

## 2022-10-04 NOTE — PATIENT INSTRUCTIONS
A Healthy Lifestyle: Care Instructions  Your Care Instructions     A healthy lifestyle can help you feel good, stay at a healthy weight, and have plenty of energy for both work and play. A healthy lifestyle is something you can share with your whole family. A healthy lifestyle also can lower your risk for serious health problems, such as high blood pressure, heart disease, and diabetes. You can follow a few steps listed below to improve your health and the health of your family. Follow-up care is a key part of your treatment and safety. Be sure to make and go to all appointments, and call your doctor if you are having problems. It's also a good idea to know your test results and keep a list of the medicines you take. How can you care for yourself at home? Do not eat too much sugar, fat, or fast foods. You can still have dessert and treats now and then. The goal is moderation. Start small to improve your eating habits. Pay attention to portion sizes, drink less juice and soda pop, and eat more fruits and vegetables. Eat a healthy amount of food. A 3-ounce serving of meat, for example, is about the size of a deck of cards. Fill the rest of your plate with vegetables and whole grains. Limit the amount of soda and sports drinks you have every day. Drink more water when you are thirsty. Eat plenty of fruits and vegetables every day. Have an apple or some carrot sticks as an afternoon snack instead of a candy bar. Try to have fruits and/or vegetables at every meal.  Make exercise part of your daily routine. You may want to start with simple activities, such as walking, bicycling, or slow swimming. Try to be active 30 to 60 minutes every day. You do not need to do all 30 to 60 minutes all at once. For example, you can exercise 3 times a day for 10 or 20 minutes. Moderate exercise is safe for most people, but it is always a good idea to talk to your doctor before starting an exercise program.  Keep moving.  Arnie Karimi the lawn, work in the garden, or clean your house. Take the stairs instead of the elevator at work. If you smoke, quit. People who smoke have an increased risk for heart attack, stroke, cancer, and other lung illnesses. Quitting is hard, but there are ways to boost your chance of quitting tobacco for good. Use nicotine gum, patches, or lozenges. Ask your doctor about stop-smoking programs and medicines. Keep trying. In addition to reducing your risk of diseases in the future, you will notice some benefits soon after you stop using tobacco. If you have shortness of breath or asthma symptoms, they will likely get better within a few weeks after you quit. Limit how much alcohol you drink. Moderate amounts of alcohol (up to 2 drinks a day for men, 1 drink a day for women) are okay. But drinking too much can lead to liver problems, high blood pressure, and other health problems. Family health  If you have a family, there are many things you can do together to improve your health. Eat meals together as a family as often as possible. Eat healthy foods. This includes fruits, vegetables, lean meats and dairy, and whole grains. Include your family in your fitness plan. Most people think of activities such as jogging or tennis as the way to fitness, but there are many ways you and your family can be more active. Anything that makes you breathe hard and gets your heart pumping is exercise. Here are some tips:  Walk to do errands or to take your child to school or the bus. Go for a family bike ride after dinner instead of watching TV. Where can you learn more? Go to http://www.gray.com/  Enter M681 in the search box to learn more about \"A Healthy Lifestyle: Care Instructions. \"  Current as of: June 16, 2021               Content Version: 13.2  © 1474-8773 [employer].    Care instructions adapted under license by [employer] (which disclaims liability or warranty for this information). If you have questions about a medical condition or this instruction, always ask your healthcare professional. [place] [address] any warranty or liability for your use of this information.

## 2022-11-08 ENCOUNTER — OFFICE VISIT (OUTPATIENT)
Dept: ORTHOPEDIC SURGERY | Age: 27
End: 2022-11-08
Payer: MEDICAID

## 2022-11-08 VITALS — HEIGHT: 66 IN | WEIGHT: 175 LBS | BODY MASS INDEX: 28.12 KG/M2

## 2022-11-08 DIAGNOSIS — S43.432A SUPERIOR LABRUM ANTERIOR-TO-POSTERIOR (SLAP) TEAR OF LEFT SHOULDER: ICD-10-CM

## 2022-11-08 DIAGNOSIS — G89.29 CHRONIC LEFT SHOULDER PAIN: Primary | ICD-10-CM

## 2022-11-08 DIAGNOSIS — M25.512 CHRONIC LEFT SHOULDER PAIN: Primary | ICD-10-CM

## 2022-11-08 PROCEDURE — 73030 X-RAY EXAM OF SHOULDER: CPT | Performed by: ORTHOPAEDIC SURGERY

## 2022-11-08 PROCEDURE — 99204 OFFICE O/P NEW MOD 45 MIN: CPT | Performed by: ORTHOPAEDIC SURGERY

## 2022-11-08 NOTE — PROGRESS NOTES
Patient: Rickie Sanchez. MRN: [id number]       SSN: [id number]  YOB: 1995        AGE: 28 y.o. SEX: male  Body mass index is 28.25 kg/m². PCP: Coretta Pallas, MD  11/08/22    CHIEF COMPLAINT: Left shoulder pain     HPI: Rickie Sanchez. is a 28 y.o. male patient who presents to the office today with somewhat chronic but acute on chronic left shoulder pain. Initially injured his left shoulder when playing football several years ago. He says he landed on his left arm and he felt a sharp pain in his left shoulder. He has been having off-and-on pain in that left shoulder ever since. However recently while working at [employer] while carrying a box he had a box fall onto his left shoulder and that has caused increasing pain and he felt a pop at that time. The pain is worse with use and he feels crepitus over the [facility] [place] joint with overhead use. Past Medical History:   Diagnosis Date    Depression     Hypercholesterolemia     Lead poisoning childhood    Medically noncompliant     2021 - lost to follow-up, did not continue agreed upon careplan with treatment of high chol and depression     Obesity     Prediabetes        Family History   Problem Relation Age of Onset    Diabetes Mother     Heart Failure Mother     Hypertension Mother     COPD Father     Diabetes Brother        Current Outpatient Medications   Medication Sig Dispense Refill    buPROPion XL (WELLBUTRIN XL) 150 mg tablet Take 1 Tablet by mouth daily. 7 Tablet 0    atorvastatin (LIPITOR) 20 mg tablet Take 1 Tablet by mouth nightly. Indications: excessive fat in the blood 30 Tablet 5    omeprazole (PRILOSEC) 20 mg capsule Take 1 Capsule by mouth daily. 30 Capsule 1    traZODone (DESYREL) 50 mg tablet Take 1 Tablet by mouth nightly as needed for Sleep. Indications: insomnia associated with depression 15 Tablet 1       No Known Allergies    History reviewed. No pertinent surgical history.     Social History Socioeconomic History    Marital status: SINGLE     Spouse name: Not on file    Number of children: Not on file    Years of education: Not on file    Highest education level: Not on file   Occupational History    Occupation: retail   Tobacco Use    Smoking status: Never    Smokeless tobacco: Never   Vaping Use    Vaping Use: Never used   Substance and Sexual Activity    Alcohol use: No    Drug use: No    Sexual activity: Never   Other Topics Concern    Not on file   Social History Narrative    Not on file     Social Determinants of Health     Financial Resource Strain: Not on file   Food Insecurity: Not on file   Transportation Needs: Not on file   Physical Activity: Sufficiently Active    Days of Exercise per Week: 7 days    Minutes of Exercise per Session: 40 min   Stress: Not on file   Social Connections: Not on file   Intimate Partner Violence: Not At Risk    Fear of Current or Ex-Partner: No    Emotionally Abused: No    Physically Abused: No    Sexually Abused: No   Housing Stability: Not on file       REVIEW OF SYSTEMS:    14 point review of systems on the intake form is negative except as noted in the HPI    PHYSICAL EXAMINATION:  Visit Vitals  Ht 5' 6\" (1.676 m)   Wt 175 lb (79.4 kg)   BMI 28.25 kg/m²     Body mass index is 28.25 kg/m². GENERAL: Alert and oriented x3, in no acute distress, well-developed, well-nourished. HEENT: Normocephalic, atraumatic.     Shoulder Examination     R   L  ROM   FF  Full   Full  ER  Full   Full   IR  Full   Full  Rotator Cuff Pain   Supra  -   -   Infra  -   -   Subscap -   -  Crepitus  -   +  Effusion  -   -  Warmth  -   -   Erythema  -   -  Instability  -   -  AC Joint TTP  -   +  Clavicle   Deformity -   -   TTP  -   -  Proximal Humerus   Deformity -   -   TTP  -   -  Deltoid Strength 5   5  Biceps Strength 5   5  Biceps Deformity -   -  Biceps Groove Pain -   -  Impingement Sign -   -   Pain with Rye's test and pain with anterior load-and-shift test left shoulder    IMAGING:  Imaging read by myself and interpreted as follows:  X-rays of the left shoulder were taken in the office today 4 views. Fifth view was taken with a weighted x-ray. There is a type II AC separation that appears to be chronic. There does not appear to be any changes or instability with weighting of the left arm. ASSESSMENT & PLAN  Diagnosis: Left shoulder AC joint injury, chronic, left shoulder concern for SLAP injury, acute    Yash has acute on chronic left shoulder pain with a recent injury. He has had this treated in the past without improvement of his symptoms and recently had a new traumatic injury to his left shoulder where a box of plants fell on his left shoulder and he felt a pop. On his exam he has pain with biceps testing as well as [facility] joint and slight crepitus with shoulder range of motion that appears to be over the [facility] joint. I have recommended due to the acute nature of his injury an MRI arthrogram of the left shoulder to evaluate for a possible SLAP tear as well as to evaluate the [facility] joint. I would like to see him back after that is completed. Prescription medication management discussed. Electronically signed by: Penny Milner MD    Note: This note was completed using voice recognition software.   Any typographical/name errors or mistakes are unintentional.

## 2022-11-22 ENCOUNTER — HOSPITAL ENCOUNTER (OUTPATIENT)
Dept: MRI IMAGING | Age: 27
Discharge: HOME | End: 2022-11-22
Attending: ORTHOPAEDIC SURGERY
Payer: MEDICAID

## 2022-11-22 ENCOUNTER — HOSPITAL ENCOUNTER (OUTPATIENT)
Dept: GENERAL RADIOLOGY | Age: 27
Discharge: HOME | End: 2022-11-22
Attending: ORTHOPAEDIC SURGERY
Payer: MEDICAID

## 2022-11-22 DIAGNOSIS — S43.432A SUPERIOR LABRUM ANTERIOR-TO-POSTERIOR (SLAP) TEAR OF LEFT SHOULDER: ICD-10-CM

## 2022-11-22 PROCEDURE — 74011000636 HC RX REV CODE- 636: Performed by: ORTHOPAEDIC SURGERY

## 2022-11-22 PROCEDURE — 73222 MRI JOINT UPR EXTREM W/DYE: CPT

## 2022-11-22 PROCEDURE — 74011250636 HC RX REV CODE- 250/636: Performed by: ORTHOPAEDIC SURGERY

## 2022-11-22 PROCEDURE — 74011000250 HC RX REV CODE- 250: Performed by: ORTHOPAEDIC SURGERY

## 2022-11-22 PROCEDURE — A9576 INJ PROHANCE MULTIPACK: HCPCS | Performed by: ORTHOPAEDIC SURGERY

## 2022-11-22 PROCEDURE — 77002 NEEDLE LOCALIZATION BY XRAY: CPT

## 2022-11-22 RX ORDER — LIDOCAINE HYDROCHLORIDE 10 MG/ML
5 INJECTION, SOLUTION EPIDURAL; INFILTRATION; INTRACAUDAL; PERINEURAL
Status: COMPLETED | OUTPATIENT
Start: 2022-11-22 | End: 2022-11-22

## 2022-11-22 RX ORDER — SODIUM CHLORIDE 9 MG/ML
10 INJECTION INTRAMUSCULAR; INTRAVENOUS; SUBCUTANEOUS
Status: COMPLETED | OUTPATIENT
Start: 2022-11-22 | End: 2022-11-22

## 2022-11-22 RX ADMIN — IOPAMIDOL 8 ML: 408 INJECTION, SOLUTION INTRATHECAL at 09:50

## 2022-11-22 RX ADMIN — LIDOCAINE HYDROCHLORIDE ANHYDROUS 5 ML: 10 INJECTION, SOLUTION INFILTRATION at 09:50

## 2022-11-22 RX ADMIN — GADOTERIDOL 0.15 ML: 279.3 INJECTION, SOLUTION INTRAVENOUS at 09:51

## 2022-11-22 RX ADMIN — SODIUM CHLORIDE 7 ML: 9 INJECTION, SOLUTION INTRAMUSCULAR; INTRAVENOUS; SUBCUTANEOUS at 09:50

## 2023-01-24 ENCOUNTER — OFFICE VISIT (OUTPATIENT)
Dept: ORTHOPEDIC SURGERY | Age: 28
End: 2023-01-24
Payer: MEDICAID

## 2023-01-24 VITALS — BODY MASS INDEX: 30.93 KG/M2 | TEMPERATURE: 97.6 F | WEIGHT: 191.6 LBS

## 2023-01-24 DIAGNOSIS — M54.12 CERVICAL RADICULOPATHY: Primary | ICD-10-CM

## 2023-01-24 DIAGNOSIS — S43.432A SUPERIOR LABRUM ANTERIOR-TO-POSTERIOR (SLAP) TEAR OF LEFT SHOULDER: ICD-10-CM

## 2023-01-24 DIAGNOSIS — M19.012 PRIMARY OSTEOARTHRITIS OF LEFT SHOULDER: ICD-10-CM

## 2023-01-24 PROCEDURE — 99214 OFFICE O/P EST MOD 30 MIN: CPT | Performed by: ORTHOPAEDIC SURGERY

## 2023-01-24 RX ORDER — METHYLPREDNISOLONE 4 MG/1
TABLET ORAL
Qty: 1 DOSE PACK | Refills: 0 | Status: SHIPPED | OUTPATIENT
Start: 2023-01-24

## 2023-01-24 RX ORDER — HYDROXYZINE 25 MG/1
TABLET, FILM COATED ORAL
COMMUNITY
Start: 2023-01-06

## 2023-01-24 NOTE — PROGRESS NOTES
Patient: Marie Mercer. MRN: [id number]       SSN: [id number]  YOB: 1995        AGE: 28 y.o. SEX: male  Body mass index is 30.93 kg/m². PCP: Benjamin Mejia MD  01/24/23    Chief Complaint: Left shoulder MRI follow up    HPI: Marie Garnica is a 28 y.o. male patient who   Returns to the office today for his left shoulder. He reports left shoulder pain that is similar to what he was having prior to the last time I saw him. He also reports some radiating pain and numbness and tingling that radiates down his arm to his hand. He notices when he lies down. He also notices this during the day. Past Medical History:   Diagnosis Date    Depression     Hypercholesterolemia     Lead poisoning childhood    Medically noncompliant     2021 - lost to follow-up, did not continue agreed upon careplan with treatment of high chol and depression     Obesity     Prediabetes        Family History   Problem Relation Age of Onset    Diabetes Mother     Heart Failure Mother     Hypertension Mother     COPD Father     Diabetes Brother        Current Outpatient Medications   Medication Sig Dispense Refill    methylPREDNISolone (MEDROL DOSEPACK) 4 mg tablet Per dose pack instructions 1 Dose Pack 0    hydrOXYzine HCL (ATARAX) 25 mg tablet TAKE 1/2 TO 1 (ONE-HALF TO ONE) TABLET BY MOUTH THREE TIMES DAILY AS NEEDED FOR ANXIETY      buPROPion XL (WELLBUTRIN XL) 150 mg tablet Take 1 Tablet by mouth daily. 7 Tablet 0    atorvastatin (LIPITOR) 20 mg tablet Take 1 Tablet by mouth nightly. Indications: excessive fat in the blood 30 Tablet 5    omeprazole (PRILOSEC) 20 mg capsule Take 1 Capsule by mouth daily. 30 Capsule 1    traZODone (DESYREL) 50 mg tablet Take 1 Tablet by mouth nightly as needed for Sleep. Indications: insomnia associated with depression 15 Tablet 1       No Known Allergies    History reviewed. No pertinent surgical history.     Social History     Socioeconomic History Marital status: SINGLE     Spouse name: Not on file    Number of children: Not on file    Years of education: Not on file    Highest education level: Not on file   Occupational History    Occupation: retail   Tobacco Use    Smoking status: Never    Smokeless tobacco: Never   Vaping Use    Vaping Use: Never used   Substance and Sexual Activity    Alcohol use: No    Drug use: No    Sexual activity: Never   Other Topics Concern    Not on file   Social History Narrative    Not on file     Social Determinants of Health     Financial Resource Strain: Not on file   Food Insecurity: Not on file   Transportation Needs: Not on file   Physical Activity: Sufficiently Active    Days of Exercise per Week: 7 days    Minutes of Exercise per Session: 40 min   Stress: Not on file   Social Connections: Not on file   Intimate Partner Violence: Not At Risk    Fear of Current or Ex-Partner: No    Emotionally Abused: No    Physically Abused: No    Sexually Abused: No   Housing Stability: Not on file       REVIEW OF SYSTEMS:      No changes from previous review of systems unless noted. PHYSICAL EXAMINATION:  Visit Vitals  Temp 97.6 °F (36.4 °C) (Temporal)   Wt 191 lb 9.6 oz (86.9 kg)   BMI 30.93 kg/m²     Body mass index is 30.93 kg/m². GENERAL: Alert and oriented x3, in no acute distress. HEENT: Normocephalic, atraumatic. Shoulder Examination     R   L  ROM   FF  Full   Full  ER  Full   Full   IR  Full   Full  Rotator Cuff Pain   Supra  -   -   Infra  -   -   Subscap -   -  Crepitus  -   +  Effusion  -   -  Warmth  -   -   Erythema  -   -  Instability  -   -  AC Joint TTP  -   -  Clavicle   Deformity -   -   TTP  -   -  Proximal Humerus   Deformity -   -   TTP  -   -  Deltoid Strength 5   5  Biceps Strength 5   5  Biceps Deformity -   -  Biceps Groove Pain -   -  Impingement Sign -   -   Pain with [place] testing left shoulder.     Cervical Spine Examination  Neck ROM   Flexion    Full   Extension   Full   Lateral Rotation  Full  Spurling's    Neg  Focal Neuro Deficits   None  Sensation C5-T1   Full  Strength C5-T1   5/5  Tenderness C Spine   None  Stepoff C Spine   None  Paraspinal Tenderness  None  Other Findings   None      IMAGING:  Imaging read by myself and interpreted as follows: An MRI arthrogram of the left shoulder was reviewed. This shows a possible SLAP tear. No significant abnormality of the [facility] joint is noted. ASSESSMENT & PLAN  Diagnosis: Left shoulder AC joint crepitus, cervical radiculopathy, SLAP tear    Deisy Mullen has a SLAP tear on his MRI and he does have pain associated with this. He also has some crepitus with with what appears to be over the [facility] joint but his [facility] joint is normal on radiographic imaging including MRI. He is also complaining of numbness and tingling that radiates down his arm. I think this is his main complaint. I have recommended physical therapy and a Medrol Dosepak for this along for pain coming from the shoulder. I do not see anything on his MRI that I would recommend surgery for at this time. I would like to see him back in 6 to 8 weeks to see how he responds to therapy. Prescription medication management discussed with patient. Electronically signed by: Lacie Sherwood MD    Note: This note was completed using voice recognition software.   Any typographical/name errors or mistakes are unintentional.

## 2023-03-14 ENCOUNTER — OFFICE VISIT (OUTPATIENT)
Age: 28
End: 2023-03-14

## 2023-03-14 VITALS — HEIGHT: 66 IN | BODY MASS INDEX: 30.93 KG/M2 | RESPIRATION RATE: 18 BRPM

## 2023-03-14 DIAGNOSIS — M54.12 LEFT CERVICAL RADICULOPATHY: Primary | ICD-10-CM

## 2023-03-14 DIAGNOSIS — S43.432A SUPERIOR GLENOID LABRUM LESION OF LEFT SHOULDER, INITIAL ENCOUNTER: ICD-10-CM

## 2023-03-14 NOTE — PROGRESS NOTES
[facility] SPECIALISTS AMBULATORY OFFICE NOTE    Patient: Rajan Atkins. MRN: [id number]       SSN: [id number]  YOB: 1995        AGE: 28 y.o. SEX: male  Body mass index is 30.93 kg/m². PCP: Yoandy Davidson MD  03/14/23    Chief Complaint: Left shoulder and arm pain follow-up    HPI: Rajan Atkins. is a 28 y.o. male patient who returns today for his left shoulder and his left arm. He still continues to have some crepitus with left shoulder motion which is occasionally painful. He also complains of some numbness and tingling that radiates down his left arm to his wrist.  He did not go to physical therapy as he was not contacted. But he has been doing home exercises and says some of the pain and symptoms in his left shoulder is improved. He rates his pain as 3 out of 10 today. Past Medical History:   Diagnosis Date    Depression     Hypercholesterolemia     Lead poisoning childhood    Medically noncompliant     2021 - lost to follow-up, did not continue agreed upon careplan with treatment of high chol and depression     Obesity     Prediabetes        Family History   Problem Relation Age of Onset    Hypertension Mother     Diabetes Brother     COPD Father     Heart Failure Mother     Diabetes Mother        Current Outpatient Medications   Medication Sig Dispense Refill    atorvastatin (LIPITOR) 20 MG tablet Take 20 mg by mouth      buPROPion (WELLBUTRIN XL) 150 MG extended release tablet Take 150 mg by mouth daily      omeprazole (PRILOSEC) 20 MG delayed release capsule Take 20 mg by mouth daily      traZODone (DESYREL) 50 MG tablet Take 50 mg by mouth       No current facility-administered medications for this visit. Not on File    History reviewed. No pertinent surgical history.     Social History     Socioeconomic History    Marital status: Single     Spouse name: Not on file    Number of children: Not on file    Years of education: Not on file    Highest education level: Not on file   Occupational History    Not on file   Tobacco Use    Smoking status: Never    Smokeless tobacco: Never   Substance and Sexual Activity    Alcohol use: No    Drug use: No    Sexual activity: Not on file   Other Topics Concern    Not on file   Social History Narrative    Not on file     Social Determinants of Health     Financial Resource Strain: Not on file   Food Insecurity: Not on file   Transportation Needs: Not on file   Physical Activity: Sufficiently Active    Days of Exercise per Week: 7 days    Minutes of Exercise per Session: 40 min   Stress: Not on file   Social Connections: Not on file   Intimate Partner Violence: Not At Risk    Fear of Current or Ex-Partner: No    Emotionally Abused: No    Physically Abused: No    Sexually Abused: No   Housing Stability: Not on file       REVIEW OF SYSTEMS:      No changes from previous review of systems unless noted. PHYSICAL EXAMINATION:  Resp 18   Ht 5' 6\" (1.676 m)   BMI 30.93 kg/m²   Body mass index is 30.93 kg/m². GENERAL: Alert and oriented x3, in no acute distress. HEENT: Normocephalic, atraumatic. Shoulder Examination     R   L  ROM   FF  Full   Full  ER  Full   Full   IR  Full   Full  Rotator Cuff Pain   Supra  -   -   Infra  -   -   Subscap -   -  Crepitus  -   +  Effusion  -   -  Warmth  -   -   Erythema  -   -  Instability  -   -  AC Joint TTP  -   -  Clavicle   Deformity -   -   TTP  -   -  Proximal Humerus   Deformity -   -   TTP  -   -  Deltoid Strength 5   5  Biceps Strength 5   5  Biceps Deformity -   -  Biceps Groove Pain -   -  Impingement Sign -   -   Pain with [place] testing left shoulder. IMAGING:  Imaging read by myself and interpreted as follows:      ASSESSMENT & PLAN  Diagnosis: Left arm likely cervical radiculopathy, left shoulder SLAP tear    I continue to recommend to [facility] today that he forego surgery at this time.   He has improved somewhat with home exercises for his left shoulder. He is also complaining of some numbness and tingling radiating down his arm and I have recommended he follow-up with our spine team to discuss that. I would like to see him back after his consultation for his neck. Prescription medication management discussed with patient. Electronically signed by: Reggie Copeland MD     Note: This note was completed using voice recognition software.   Any typographical/name errors or mistakes are unintentional.

## 2023-03-17 ENCOUNTER — APPOINTMENT (OUTPATIENT)
Facility: HOSPITAL | Age: 28
End: 2023-03-17
Payer: MEDICAID

## 2023-03-17 ENCOUNTER — HOSPITAL ENCOUNTER (EMERGENCY)
Facility: HOSPITAL | Age: 28
Discharge: HOME | End: 2023-03-17
Attending: EMERGENCY MEDICINE
Payer: MEDICAID

## 2023-03-17 VITALS
RESPIRATION RATE: 16 BRPM | SYSTOLIC BLOOD PRESSURE: 118 MMHG | HEART RATE: 74 BPM | DIASTOLIC BLOOD PRESSURE: 72 MMHG | OXYGEN SATURATION: 98 % | TEMPERATURE: 98.2 F

## 2023-03-17 DIAGNOSIS — E16.2 HYPOGLYCEMIA: Primary | ICD-10-CM

## 2023-03-17 LAB
ALBUMIN SERPL-MCNC: 4.2 G/DL (ref 3.4–5)
ALBUMIN/GLOB SERPL: 1 (ref 0.8–1.7)
ALP SERPL-CCNC: 45 U/L (ref 45–117)
ALT SERPL-CCNC: 29 U/L (ref 16–61)
AMPHET UR QL SCN: NEGATIVE
ANION GAP SERPL CALC-SCNC: 3 MMOL/L (ref 3–18)
APAP SERPL-MCNC: <2 UG/ML (ref 10–30)
APPEARANCE UR: CLEAR
AST SERPL-CCNC: 27 U/L (ref 10–38)
BARBITURATES UR QL SCN: NEGATIVE
BASOPHILS # BLD: 0 K/UL (ref 0–0.1)
BASOPHILS NFR BLD: 0 % (ref 0–2)
BENZODIAZ UR QL: NEGATIVE
BILIRUB SERPL-MCNC: 0.5 MG/DL (ref 0.2–1)
BILIRUB UR QL: NEGATIVE
BUN SERPL-MCNC: 21 MG/DL (ref 7–18)
BUN/CREAT SERPL: 19 (ref 12–20)
CALCIUM SERPL-MCNC: 9.7 MG/DL (ref 8.5–10.1)
CANNABINOIDS UR QL SCN: NEGATIVE
CHLORIDE SERPL-SCNC: 105 MMOL/L (ref 100–111)
CO2 SERPL-SCNC: 29 MMOL/L (ref 21–32)
COCAINE UR QL SCN: NEGATIVE
COLOR UR: YELLOW
CREAT SERPL-MCNC: 1.13 MG/DL (ref 0.6–1.3)
DIFFERENTIAL METHOD BLD: ABNORMAL
EOSINOPHIL # BLD: 0.1 K/UL (ref 0–0.4)
EOSINOPHIL NFR BLD: 2 % (ref 0–5)
ERYTHROCYTE [DISTWIDTH] IN BLOOD BY AUTOMATED COUNT: 13.4 % (ref 11.6–14.5)
ETHANOL SERPL-MCNC: <3 MG/DL (ref 0–3)
GLOBULIN SER CALC-MCNC: 4.3 G/DL (ref 2–4)
GLUCOSE BLD STRIP.AUTO-MCNC: 118 MG/DL (ref 70–110)
GLUCOSE BLD STRIP.AUTO-MCNC: 80 MG/DL (ref 70–110)
GLUCOSE BLD STRIP.AUTO-MCNC: 84 MG/DL (ref 70–110)
GLUCOSE SERPL-MCNC: 115 MG/DL (ref 74–99)
GLUCOSE UR STRIP.AUTO-MCNC: NEGATIVE MG/DL
HCT VFR BLD AUTO: 46.6 % (ref 36–48)
HGB BLD-MCNC: 15 G/DL (ref 13–16)
HGB UR QL STRIP: NEGATIVE
IMM GRANULOCYTES # BLD AUTO: 0 K/UL (ref 0–0.04)
IMM GRANULOCYTES NFR BLD AUTO: 0 % (ref 0–0.5)
KETONES UR QL STRIP.AUTO: NEGATIVE MG/DL
LEUKOCYTE ESTERASE UR QL STRIP.AUTO: NEGATIVE
LYMPHOCYTES # BLD: 2 K/UL (ref 0.9–3.6)
LYMPHOCYTES NFR BLD: 34 % (ref 21–52)
Lab: NORMAL
MCH RBC QN AUTO: 26 PG (ref 24–34)
MCHC RBC AUTO-ENTMCNC: 32.2 G/DL (ref 31–37)
MCV RBC AUTO: 80.9 FL (ref 78–100)
METHADONE UR QL: NEGATIVE
MONOCYTES # BLD: 0.2 K/UL (ref 0.05–1.2)
MONOCYTES NFR BLD: 4 % (ref 3–10)
NEUTS SEG # BLD: 3.6 K/UL (ref 1.8–8)
NEUTS SEG NFR BLD: 61 % (ref 40–73)
NITRITE UR QL STRIP.AUTO: NEGATIVE
NRBC # BLD: 0 K/UL (ref 0–0.01)
NRBC BLD-RTO: 0 PER 100 WBC
OPIATES UR QL: NEGATIVE
PCP UR QL: NEGATIVE
PH UR STRIP: 5.5 (ref 5–8)
PLATELET # BLD AUTO: 312 K/UL (ref 135–420)
PMV BLD AUTO: 9.4 FL (ref 9.2–11.8)
POTASSIUM SERPL-SCNC: 4.7 MMOL/L (ref 3.5–5.5)
PROT SERPL-MCNC: 8.5 G/DL (ref 6.4–8.2)
PROT UR STRIP-MCNC: NEGATIVE MG/DL
RBC # BLD AUTO: 5.76 M/UL (ref 4.35–5.65)
SALICYLATES SERPL-MCNC: <1.7 MG/DL (ref 2.8–20)
SODIUM SERPL-SCNC: 137 MMOL/L (ref 136–145)
SP GR UR REFRACTOMETRY: 1.01 (ref 1–1.03)
UROBILINOGEN UR QL STRIP.AUTO: 0.2 EU/DL (ref 0.2–1)
WBC # BLD AUTO: 6 K/UL (ref 4.6–13.2)

## 2023-03-17 PROCEDURE — 80179 DRUG ASSAY SALICYLATE: CPT

## 2023-03-17 PROCEDURE — 85025 COMPLETE CBC W/AUTO DIFF WBC: CPT

## 2023-03-17 PROCEDURE — 93005 ELECTROCARDIOGRAM TRACING: CPT | Performed by: STUDENT IN AN ORGANIZED HEALTH CARE EDUCATION/TRAINING PROGRAM

## 2023-03-17 PROCEDURE — 80143 DRUG ASSAY ACETAMINOPHEN: CPT

## 2023-03-17 PROCEDURE — 71045 X-RAY EXAM CHEST 1 VIEW: CPT

## 2023-03-17 PROCEDURE — 80053 COMPREHEN METABOLIC PANEL: CPT

## 2023-03-17 PROCEDURE — 82962 GLUCOSE BLOOD TEST: CPT

## 2023-03-17 PROCEDURE — 81003 URINALYSIS AUTO W/O SCOPE: CPT

## 2023-03-17 PROCEDURE — 99285 EMERGENCY DEPT VISIT HI MDM: CPT

## 2023-03-17 PROCEDURE — 80307 DRUG TEST PRSMV CHEM ANLYZR: CPT

## 2023-03-17 PROCEDURE — 82077 ASSAY SPEC XCP UR&BREATH IA: CPT

## 2023-03-17 ASSESSMENT — ENCOUNTER SYMPTOMS
CHEST TIGHTNESS: 0
SORE THROAT: 0
SINUS PRESSURE: 0
DIARRHEA: 0
SHORTNESS OF BREATH: 0
VOMITING: 0
TROUBLE SWALLOWING: 0
COUGH: 0
PHOTOPHOBIA: 1
NAUSEA: 0
ABDOMINAL PAIN: 0

## 2023-03-17 NOTE — ED TRIAGE NOTES
Patient arrives from work via EMS due to hypoglycemia. Patient acording to report was feeling unwell this morning prior to work and this increased during work. EMS was called by a coworker. Upon arrival patient blood glucose read low on the meter and supplemental oral glucose was given. Recheck was 75.  Patient arrives tired/weak but responsive to questions and follows commands

## 2023-03-17 NOTE — Clinical Note
Tin De La Rosa was seen and treated in our emergency department on 3/17/2023. He may return to work on 03/19/2023. If you have any questions or concerns, please don't hesitate to call.       Mala Denise MD

## 2023-03-17 NOTE — DISCHARGE INSTRUCTIONS
Make sure you follow-up with your primary care doctor as soon as you are able, preferably within the next 3 to 5 days. Continue to eat small meals/snacks throughout the day to make sure that you keep your blood sugar within normal range. Return immediately to the ER for episodes of passing out, inability to keep fluids down due to nausea and vomiting, severe chest pain or shortness of breath, or any other concerns.

## 2023-03-17 NOTE — ED PROVIDER NOTES
Emergency Department Treatment Report    Patient: Pop Hardy. Age: 28 y.o. Sex: male    YOB: 1995 Admit Date: 3/17/2023 PCP: Moisés Sieegl MD   MRN: [id number]  CSN: [id number]     Room: KWESI/KWESI Time Dictated: 7:04 PM        Chief Complaint   Chief Complaint   Patient presents with    Hypoglycemia       History of Present Illness   Pop Christina is a 28 y.o. male with past medical history of anxiety, depression, lead poisoning, and prediabetes who presents to the ED via EMS, with the chief complaint of \"not feeling well. \" Per EMS, pt told his coworkers he was not feeling well so they called EMS. When they arrived, his blood sugar was reading low on the glucometer, so we provided oral glucose with improvement to a fingerstick of 75. On arrival to ED, patient states he feels a little bit better, however he still feels weak all over. He denies history of diabetes, however states he has been told he has prediabetes. He states that he has lost approximately 30 pounds in the last 3 months. Patient reports he was feeling fine this morning when he woke up, ate a normal breakfast, and walk to work like he normally does. He reports drinking an energy drink and then later a coffee, stating that is normal for him. He then reports developing a headache with some photophobia and noticing that he was feeling generalized weakness with some eye twitching. He denies syncope and his coworkers denied seizure-like activity when EMS arrived. He states he remembers the entire time he was at work until EMS came. Review of Systems   Review of Systems   Constitutional:  Negative for chills, diaphoresis, fatigue and fever. HENT:  Negative for congestion, sinus pressure, sore throat and trouble swallowing. Eyes:  Positive for photophobia. Negative for visual disturbance. Respiratory:  Negative for cough, chest tightness and shortness of breath.     Cardiovascular:  Negative for chest pain and leg swelling. Gastrointestinal:  Negative for abdominal pain, diarrhea, nausea and vomiting. Genitourinary:  Negative for dysuria, frequency and hematuria. Musculoskeletal:  Negative for joint swelling, neck pain and neck stiffness. Skin:  Negative for rash. Neurological:  Positive for headaches. Negative for dizziness, syncope and light-headedness. Psychiatric/Behavioral:  Negative for behavioral problems and self-injury. Past Medical/Surgical History     Past Medical History:   Diagnosis Date    Depression     Hypercholesterolemia     Lead poisoning childhood    Medically noncompliant     2021 - lost to follow-up, did not continue agreed upon careplan with treatment of high chol and depression     Obesity     Prediabetes      No past surgical history on file.     Social History     Social History     Socioeconomic History    Marital status: Single   Tobacco Use    Smoking status: Never    Smokeless tobacco: Never   Substance and Sexual Activity    Alcohol use: No    Drug use: No     Social Determinants of Health     Physical Activity: Sufficiently Active    Days of Exercise per Week: 7 days    Minutes of Exercise per Session: 40 min   Intimate Partner Violence: Not At Risk    Fear of Current or Ex-Partner: No    Emotionally Abused: No    Physically Abused: No    Sexually Abused: No       Family History     Family History   Problem Relation Age of Onset    Hypertension Mother     Diabetes Brother     COPD Father     Heart Failure Mother     Diabetes Mother        Current Medications     Previous Medications    ATORVASTATIN (LIPITOR) 20 MG TABLET    Take 20 mg by mouth    BUPROPION (WELLBUTRIN XL) 150 MG EXTENDED RELEASE TABLET    Take 150 mg by mouth daily    OMEPRAZOLE (PRILOSEC) 20 MG DELAYED RELEASE CAPSULE    Take 20 mg by mouth daily    TRAZODONE (DESYREL) 50 MG TABLET    Take 50 mg by mouth        Allergies   No Known Allergies    Physical Exam     ED Triage Vitals   BP Temp Temp src Pulse Resp SpO2 Height Weight   -- -- -- -- -- -- -- --        Physical Exam  Vitals and nursing note reviewed. Constitutional:       Appearance: Normal appearance. HENT:      Head: Normocephalic and atraumatic. Nose: Nose normal.      Mouth/Throat:      Mouth: Mucous membranes are dry. Pharynx: Oropharynx is clear. Eyes:      Extraocular Movements: Extraocular movements intact. Conjunctiva/sclera: Conjunctivae normal.      Pupils: Pupils are equal, round, and reactive to light. Cardiovascular:      Rate and Rhythm: Normal rate and regular rhythm. Pulses: Normal pulses. Heart sounds: Normal heart sounds. Pulmonary:      Effort: Pulmonary effort is normal.      Breath sounds: Normal breath sounds. Abdominal:      Palpations: Abdomen is soft. Tenderness: There is no abdominal tenderness. Musculoskeletal:         General: Normal range of motion. Cervical back: Normal range of motion and neck supple. Right lower leg: No edema. Left lower leg: No edema. Skin:     General: Skin is warm and dry. Capillary Refill: Capillary refill takes less than 2 seconds. Neurological:      General: No focal deficit present. Mental Status: He is alert and oriented to person, place, and time. Psychiatric:         Mood and Affect: Mood normal.         Behavior: Behavior normal.        Impression and Management Plan   28 y.o. male who presents to the ED with hypoglycemia generalized weakness. Differential diagnosis includes but is not limited to diabetes, drug/medication overdose, sepsis, alcohol abuse, and adrenal insufficiency.     Diagnostic Studies   Lab:   Recent Results (from the past 12 hour(s))   POCT Glucose    Collection Time: 03/17/23 11:36 AM   Result Value Ref Range    POC Glucose 80 70 - 110 mg/dL   CBC with Auto Differential    Collection Time: 03/17/23 12:07 PM   Result Value Ref Range    WBC 6.0 4.6 - 13.2 K/uL    RBC 5.76 (H) 4.35 - 5.65 M/uL    Hemoglobin 15.0 13.0 - 16.0 g/dL    Hematocrit 46.6 36.0 - 48.0 %    MCV 80.9 78.0 - 100.0 FL    MCH 26.0 24.0 - 34.0 PG    MCHC 32.2 31.0 - 37.0 g/dL    RDW 13.4 11.6 - 14.5 %    Platelets 925 156 - 101 K/uL    MPV 9.4 9.2 - 11.8 FL    Nucleated RBCs 0.0 0  WBC    nRBC 0.00 0.00 - 0.01 K/uL    Seg Neutrophils 61 40 - 73 %    Lymphocytes 34 21 - 52 %    Monocytes 4 3 - 10 %    Eosinophils % 2 0 - 5 %    Basophils 0 0 - 2 %    Immature Granulocytes 0 0.0 - 0.5 %    Segs Absolute 3.6 1.8 - 8.0 K/UL    Absolute Lymph # 2.0 0.9 - 3.6 K/UL    Absolute Mono # 0.2 0.05 - 1.2 K/UL    Absolute Eos # 0.1 0.0 - 0.4 K/UL    Basophils Absolute 0.0 0.0 - 0.1 K/UL    Absolute Immature Granulocyte 0.0 0.00 - 0.04 K/UL    Differential Type AUTOMATED     CMP    Collection Time: 03/17/23 12:07 PM   Result Value Ref Range    Sodium 137 136 - 145 mmol/L    Potassium 4.7 3.5 - 5.5 mmol/L    Chloride 105 100 - 111 mmol/L    CO2 29 21 - 32 mmol/L    Anion Gap 3 3.0 - 18 mmol/L    Glucose 115 (H) 74 - 99 mg/dL    BUN 21 (H) 7.0 - 18 MG/DL    Creatinine 1.13 0.6 - 1.3 MG/DL    Bun/Cre Ratio 19 12 - 20      Est, Glom Filt Rate >60 >60 ml/min/1.73m2    Calcium 9.7 8.5 - 10.1 MG/DL    Total Bilirubin 0.5 0.2 - 1.0 MG/DL    ALT 29 16 - 61 U/L    AST 27 10 - 38 U/L    Alk Phosphatase 45 45 - 117 U/L    Total Protein 8.5 (H) 6.4 - 8.2 g/dL    Albumin 4.2 3.4 - 5.0 g/dL    Globulin 4.3 (H) 2.0 - 4.0 g/dL    Albumin/Globulin Ratio 1.0 0.8 - 1.7     Acetaminophen Level    Collection Time: 03/17/23 12:07 PM   Result Value Ref Range    Acetaminophen Level <2 (L) 10.0 - 90.7 ug/mL   Salicylate    Collection Time: 03/17/23 12:07 PM   Result Value Ref Range    Salicylate, Serum <1.3 (L) 2.8 - 20.0 MG/DL   Ethanol    Collection Time: 03/17/23 12:07 PM   Result Value Ref Range    Ethanol Lvl <3 0 - 3 MG/DL   EKG 12 Lead    Collection Time: 03/17/23 12:18 PM   Result Value Ref Range    Ventricular Rate 59 BPM    Atrial Rate 59 BPM    P-R Interval 196 ms    QRS Duration 102 ms    Q-T Interval 382 ms    QTc Calculation (Bazett) 378 ms    P Axis 32 degrees    R Axis 47 degrees    T Axis 35 degrees    Diagnosis Sinus bradycardia    POCT Glucose    Collection Time: 03/17/23  1:14 PM   Result Value Ref Range    POC Glucose 84 70 - 110 mg/dL   Urinalysis    Collection Time: 03/17/23  1:15 PM   Result Value Ref Range    Color, UA YELLOW      Appearance CLEAR      Specific Gravity, UA 1.011 1.005 - 1.030      pH, Urine 5.5 5.0 - 8.0      Protein, UA Negative NEG mg/dL    Glucose, UA Negative NEG mg/dL    Ketones, Urine Negative NEG mg/dL    Bilirubin Urine Negative NEG      Blood, Urine Negative NEG      Urobilinogen, Urine 0.2 0.2 - 1.0 EU/dL    Nitrite, Urine Negative NEG      Leukocyte Esterase, Urine Negative NEG     Urine Drug Screen    Collection Time: 03/17/23  1:15 PM   Result Value Ref Range    Benzodiazepines, Urine Negative NEG      Barbiturates, Urine Negative NEG      THC, TH-Cannabinol, Urine Negative NEG      Opiates, Urine Negative NEG      PCP, Urine Negative NEG      Cocaine, Urine Negative NEG      Amphetamine, Urine Negative NEG      Methadone, Urine Negative NEG      Comments: (NOTE)    POCT Glucose    Collection Time: 03/17/23  3:30 PM   Result Value Ref Range    POC Glucose 118 (H) 70 - 110 mg/dL       Imaging:    XR CHEST PORTABLE   Final Result      No acute findings or interval change. EKG as interpreted by me shows sinus rhythm with a rate of 59 bpm.  WA, QRS, and QTc intervals within normal limits. No ST segment elevations or depressions to suggest acute ischemia or infarction. ED Course/Medical Decision Making   28 y.o. male who presents to the ED with hypoglycemia and generalized weakness. He denies loss of consciousness, chest pain, or shortness of breath. .      On exam, patient well-appearing, in no acute distress. No findings to suggest dehydration. Abdomen soft and nontender, no focal neurodeficits.     Patient with history of prediabetes, states he is not on any medications for it, and has been trying to lose weight to assist with this. ED Course as of 03/17/23 1904   Fri Mar 17, 2023   1346 WBC: 6.0 [HC]   1346 Hemoglobin Quant: 15.0 [HC]   1346 Platelet Count: 045 [HC]   1346 Sodium: 137 [HC]   1346 Potassium: 4.7 [HC]   1346 Creatinine: 1.13 [HC]   1346 BILIRUBIN TOTAL: 0.5 [HC]   1346 ALT: 29 [HC]   1346 AST: 27 [HC]   1346 Alk Phosphatase: 45 [HC]   1346 Acetaminophen Level(!): <2 [HC]   5320 Salicylate, Serum(!): <9.9 [HC]   1346 Ethanol Lvl: <3 [HC]   1346 POC Glucose: 84 [HC]   1347 Nitrite, Urine: Negative [HC]   1347 Leukocyte Esterase, Urine: Negative [HC]   1347 Ketones, Urine: Negative [HC]   1347 Blood, Urine: Negative  UDS negative [HC]   3170 CXR without acute cardiopulmonary process. [HC]   7181 Re-examined pt, he states he is feeling a lot better after eating some crackers and drinking juice. [HC]   1 Pt continues to feel well, desiring to go home. Plan to d/c home to f/u with his PCM. Pt expressed understanding and agreement with plan. [HC]   4062 POC Glucose(!): 118 [HC]      ED Course User Index  [HC] Lucero Sheppard MD       Medications - No data to display        RECORDS REVIEWED:  I reviewed the patient's previous records here at [facility] and note that patient's last visit to this ED for syncope and anxiety. EXTERNAL RECORDS REVIEWED: Patient seen by orthopedics for chronic left shoulder pain. INDEPENDENT HISTORIAN:  History and/or plan development assisted by: Patient unaccompanied. SOCIAL DETERMINANTS  impacting Evaluation and Management: None identified during this visit. Final Diagnosis     1. Hypoglycemia          Disposition   Discharge home.     Elvin Jensen MD  Emergency Medicine PGY-4  [facility]  March 17, 2023  7:04 PM    Patient seen with Attending, Dr. Leann Tanner      Please note that portions of this note were completed with a voice recognition program.  Efforts were made to edit the dictations but occasionally words are mis-transcribed.          Bharti Almaguer MD  Resident  03/17/23 [id number]

## 2023-03-17 NOTE — Clinical Note
Maryam Killian was seen and treated in our emergency department on 3/17/2023. He may return to work on 03/19/2023. If you have any questions or concerns, please don't hesitate to call.       Dodie Sultana MD

## 2023-03-18 LAB
EKG ATRIAL RATE: 59 BPM
EKG DIAGNOSIS: NORMAL
EKG P AXIS: 32 DEGREES
EKG P-R INTERVAL: 196 MS
EKG Q-T INTERVAL: 382 MS
EKG QRS DURATION: 102 MS
EKG QTC CALCULATION (BAZETT): 378 MS
EKG R AXIS: 47 DEGREES
EKG T AXIS: 35 DEGREES
EKG VENTRICULAR RATE: 59 BPM

## 2023-03-18 PROCEDURE — 93010 ELECTROCARDIOGRAM REPORT: CPT | Performed by: INTERNAL MEDICINE

## 2023-04-03 ENCOUNTER — HOSPITAL ENCOUNTER (OUTPATIENT)
Facility: HOSPITAL | Age: 28
Discharge: HOME | End: 2023-04-06

## 2023-04-03 LAB
A/G RATIO: 1.5 RATIO (ref 1.1–2.6)
ALBUMIN SERPL-MCNC: 4.4 G/DL (ref 3.5–5)
ALP BLD-CCNC: 47 U/L (ref 25–115)
ALT SERPL-CCNC: 15 U/L (ref 5–40)
ANION GAP SERPL CALCULATED.3IONS-SCNC: 8 MMOL/L (ref 3–15)
AST SERPL-CCNC: 13 U/L (ref 10–37)
AVERAGE GLUCOSE: 114 MG/DL (ref 91–123)
BASOPHILS # BLD: 1 % (ref 0–2)
BASOPHILS ABSOLUTE: 0 K/UL (ref 0–0.2)
BILIRUB SERPL-MCNC: 0.5 MG/DL (ref 0.2–1.2)
BUN BLDV-MCNC: 12 MG/DL (ref 6–22)
CALCIUM SERPL-MCNC: 9.4 MG/DL (ref 8.4–10.5)
CHLORIDE BLD-SCNC: 106 MMOL/L (ref 98–110)
CHOLESTEROL/HDL RATIO: 4.2 (ref 0–5)
CHOLESTEROL: 222 MG/DL (ref 110–200)
CO2: 25 MMOL/L (ref 20–32)
CREAT SERPL-MCNC: 1 MG/DL (ref 0.5–1.2)
EOSINOPHIL # BLD: 3 % (ref 0–6)
EOSINOPHILS ABSOLUTE: 0.1 K/UL (ref 0–0.5)
GLOBULIN: 3 G/DL (ref 2–4)
GLOMERULAR FILTRATION RATE: >60 ML/MIN/1.73 SQ.M.
GLUCOSE: 90 MG/DL (ref 70–99)
HBA1C MFR BLD: 5.6 % (ref 4.8–5.6)
HCT VFR BLD CALC: 48 % (ref 36.6–51.9)
HDLC SERPL-MCNC: 53 MG/DL
HEMOGLOBIN: 14.9 G/DL (ref 13.2–17.3)
LDL CHOLESTEROL CALCULATED: 155 MG/DL (ref 50–99)
LDL/HDL RATIO: 2.9
LYMPHOCYTES # BLD: 41 % (ref 20–45)
LYMPHOCYTES ABSOLUTE: 1.8 K/UL (ref 1–4.8)
MCH RBC QN AUTO: 26 PG (ref 26–34)
MCHC RBC AUTO-ENTMCNC: 31 G/DL (ref 31–36)
MCV RBC AUTO: 82 FL (ref 80–95)
MONOCYTES ABSOLUTE: 0.2 K/UL (ref 0.1–1)
MONOCYTES: 5 % (ref 3–12)
NEUTROPHILS ABSOLUTE: 2.2 K/UL (ref 1.8–7.7)
NEUTROPHILS: 50 % (ref 40–75)
NON-HDL CHOLESTEROL: 169 MG/DL
PDW BLD-RTO: 13.8 % (ref 10–15.5)
PLATELET # BLD: 354 K/UL (ref 140–440)
PMV BLD AUTO: 9.4 FL (ref 9–13)
POTASSIUM SERPL-SCNC: 4.3 MMOL/L (ref 3.5–5.5)
RBC: 5.83 M/UL (ref 3.8–5.8)
SENTARA SPECIMEN COLLECTION: NORMAL
SODIUM BLD-SCNC: 139 MMOL/L (ref 133–145)
TOTAL PROTEIN: 7.4 G/DL (ref 6.4–8.3)
TRIGL SERPL-MCNC: 70 MG/DL (ref 40–149)
VLDLC SERPL CALC-MCNC: 14 MG/DL (ref 8–30)
WBC: 4.4 K/UL (ref 4–11)

## 2023-04-03 PROCEDURE — 99001 SPECIMEN HANDLING PT-LAB: CPT

## 2023-04-04 ENCOUNTER — OFFICE VISIT (OUTPATIENT)
Age: 28
End: 2023-04-04
Payer: MEDICAID

## 2023-04-04 VITALS
RESPIRATION RATE: 18 BRPM | TEMPERATURE: 98 F | HEART RATE: 80 BPM | HEIGHT: 66 IN | BODY MASS INDEX: 31.74 KG/M2 | SYSTOLIC BLOOD PRESSURE: 130 MMHG | DIASTOLIC BLOOD PRESSURE: 80 MMHG | WEIGHT: 197.5 LBS | OXYGEN SATURATION: 97 %

## 2023-04-04 DIAGNOSIS — F33.0 MAJOR DEPRESSIVE DISORDER, RECURRENT, MILD (HCC): Primary | ICD-10-CM

## 2023-04-04 DIAGNOSIS — R73.03 PREDIABETES: ICD-10-CM

## 2023-04-04 DIAGNOSIS — E66.09 OBESITY DUE TO EXCESS CALORIES WITH SERIOUS COMORBIDITY, UNSPECIFIED CLASSIFICATION: ICD-10-CM

## 2023-04-04 DIAGNOSIS — E78.2 MIXED HYPERLIPIDEMIA: ICD-10-CM

## 2023-04-04 PROCEDURE — 99214 OFFICE O/P EST MOD 30 MIN: CPT | Performed by: FAMILY MEDICINE

## 2023-04-04 RX ORDER — HYDROXYZINE HYDROCHLORIDE 25 MG/1
TABLET, FILM COATED ORAL
COMMUNITY
Start: 2023-02-15

## 2023-04-04 RX ORDER — ARIPIPRAZOLE 5 MG/1
5 TABLET ORAL DAILY
COMMUNITY
Start: 2023-03-22

## 2023-04-04 RX ORDER — ATORVASTATIN CALCIUM 10 MG/1
10 TABLET, FILM COATED ORAL NIGHTLY
Qty: 30 TABLET | Refills: 11 | Status: SHIPPED | OUTPATIENT
Start: 2023-04-04

## 2023-04-04 SDOH — ECONOMIC STABILITY: HOUSING INSECURITY
IN THE LAST 12 MONTHS, WAS THERE A TIME WHEN YOU DID NOT HAVE A STEADY PLACE TO SLEEP OR SLEPT IN A SHELTER (INCLUDING NOW)?: NO

## 2023-04-04 SDOH — ECONOMIC STABILITY: FOOD INSECURITY: WITHIN THE PAST 12 MONTHS, THE FOOD YOU BOUGHT JUST DIDN'T LAST AND YOU DIDN'T HAVE MONEY TO GET MORE.: NEVER TRUE

## 2023-04-04 SDOH — ECONOMIC STABILITY: INCOME INSECURITY: HOW HARD IS IT FOR YOU TO PAY FOR THE VERY BASICS LIKE FOOD, HOUSING, MEDICAL CARE, AND HEATING?: NOT HARD AT ALL

## 2023-04-04 SDOH — ECONOMIC STABILITY: FOOD INSECURITY: WITHIN THE PAST 12 MONTHS, YOU WORRIED THAT YOUR FOOD WOULD RUN OUT BEFORE YOU GOT MONEY TO BUY MORE.: NEVER TRUE

## 2023-04-04 ASSESSMENT — PATIENT HEALTH QUESTIONNAIRE - PHQ9
6. FEELING BAD ABOUT YOURSELF - OR THAT YOU ARE A FAILURE OR HAVE LET YOURSELF OR YOUR FAMILY DOWN: 3
SUM OF ALL RESPONSES TO PHQ QUESTIONS 1-9: 21
9. THOUGHTS THAT YOU WOULD BE BETTER OFF DEAD, OR OF HURTING YOURSELF: 1
5. POOR APPETITE OR OVEREATING: 2
7. TROUBLE CONCENTRATING ON THINGS, SUCH AS READING THE NEWSPAPER OR WATCHING TELEVISION: 3
SUM OF ALL RESPONSES TO PHQ9 QUESTIONS 1 & 2: 5
SUM OF ALL RESPONSES TO PHQ QUESTIONS 1-9: 22
2. FEELING DOWN, DEPRESSED OR HOPELESS: 2
1. LITTLE INTEREST OR PLEASURE IN DOING THINGS: 3
8. MOVING OR SPEAKING SO SLOWLY THAT OTHER PEOPLE COULD HAVE NOTICED. OR THE OPPOSITE, BEING SO FIGETY OR RESTLESS THAT YOU HAVE BEEN MOVING AROUND A LOT MORE THAN USUAL: 2
3. TROUBLE FALLING OR STAYING ASLEEP: 3
SUM OF ALL RESPONSES TO PHQ QUESTIONS 1-9: 22
10. IF YOU CHECKED OFF ANY PROBLEMS, HOW DIFFICULT HAVE THESE PROBLEMS MADE IT FOR YOU TO DO YOUR WORK, TAKE CARE OF THINGS AT HOME, OR GET ALONG WITH OTHER PEOPLE: 1
SUM OF ALL RESPONSES TO PHQ QUESTIONS 1-9: 22
4. FEELING TIRED OR HAVING LITTLE ENERGY: 3

## 2023-04-04 ASSESSMENT — COLUMBIA-SUICIDE SEVERITY RATING SCALE - C-SSRS
6. HAVE YOU EVER DONE ANYTHING, STARTED TO DO ANYTHING, OR PREPARED TO DO ANYTHING TO END YOUR LIFE?: NO
5. HAVE YOU STARTED TO WORK OUT OR WORKED OUT THE DETAILS OF HOW TO KILL YOURSELF? DO YOU INTEND TO CARRY OUT THIS PLAN?: NO
1. WITHIN THE PAST MONTH, HAVE YOU WISHED YOU WERE DEAD OR WISHED YOU COULD GO TO SLEEP AND NOT WAKE UP?: YES
2. HAVE YOU ACTUALLY HAD ANY THOUGHTS OF KILLING YOURSELF?: NO
3. HAVE YOU BEEN THINKING ABOUT HOW YOU MIGHT KILL YOURSELF?: NO
4. HAVE YOU HAD THESE THOUGHTS AND HAD SOME INTENTION OF ACTING ON THEM?: NO

## 2023-04-04 NOTE — PROGRESS NOTES
Willa Hernández. presents today for   Chief Complaint   Patient presents with    Depression    Cholesterol Problem       /80 (Site: Left Upper Arm, Position: Sitting, Cuff Size: Medium Adult)   Pulse 80   Temp 98 °F (36.7 °C) (Temporal)   Resp 18   Ht 5' 6\" (1.676 m)   Wt 197 lb 8 oz (89.6 kg)   SpO2 97%   BMI 31.88 kg/m²      1. \"Have you been to the ER, urgent care clinic since your last visit? Hospitalized since your last visit? \" Yes 3/23 1316 Marta Jin (X2)Thigh injury & hypoglycemia    2. \"Have you seen or consulted any other health care providers outside of the [address] [place] [address] since your last visit? \" No     3. For patients aged 39-70: Has the patient had a colonoscopy / FIT/ Cologuard? NA - based on age      If the patient is female:    4. For patients aged 41-77: Has the patient had a mammogram within the past 2 years? NA - based on age or sex      11. For patients aged 21-65: Has the patient had a pap smear?  NA - based on age or sex
MCV 80 - 95 fL 82   MCH 26 - 34 pg 26   MCHC 31 - 36 g/dL 31   MPV 9.0 - 13.0 fL 9.4   RDW 10.0 - 15.5 % 13.8   Platelet Count 665 - 440 K/uL 354   Neutrophils Absolute 1.8 - 7.7 K/uL 2.2   Lymphocytes Absolute 1.0 - 4.8 K/uL 1.8   Monocytes Absolute 0.1 - 1.0 K/uL 0.2   Eosinophils Absolute 0.0 - 0.5 K/uL 0.1   Basophils Absolute 0.0 - 0.2 K/uL 0.0   Lymphocytes 20 - 45 % 41   Monocytes 3 - 12 % 5   Eosinophils 0 - 6 % 3   Basophils 0 - 2 % 1   Glomerular Filtration Rate >60.0 mL/min/1.73 sq.m. >60.0   Neutrophils 40 - 75 % 50   [facility] Specimen Collection -   Specimens collected/sent to [facility]   (H): Data is abnormally high    ASSESSMENT / PLAN   Diagnosis Orders   1. Major depressive disorder, recurrent, mild ([facility] [address]        2. Prediabetes        3. Mixed hyperlipidemia        4. Obesity due to excess calories with serious comorbidity, unspecified classification          Depression - controlled. Cont abilify per psychiatry. Agree with care plan. Prediabetes-  Cont diet and exercise. Reviewed A1c which is normal.       Dyslipidemia - get back on lipitor 20mg nightly. Check LFTs and lipids annually. Obesity - diet and exercise. Keep encouraging. All chart history elements were reviewed by me at the time of the visit even though marked at time of note closure. Patient understands our medical plan. Patient has provided input and agrees with goals. Alternatives have been explained and offered. All questions answered. The patient is to call if condition worsens or fails to improve. Return in about 1 year (around 4/4/2024).

## 2023-04-04 NOTE — PATIENT INSTRUCTIONS
2018  Content Version: 12.1  © 6670-1602 [employer]. Care instructions adapted under license by [employer] (which disclaims liability or warranty for this information). If you have questions about a medical condition or this instruction, always ask your healthcare professional. [address] any warranty or liability for your use of this information.

## 2023-04-14 ENCOUNTER — APPOINTMENT (OUTPATIENT)
Facility: HOSPITAL | Age: 28
End: 2023-04-14
Payer: MEDICAID

## 2023-04-14 ENCOUNTER — HOSPITAL ENCOUNTER (EMERGENCY)
Facility: HOSPITAL | Age: 28
Discharge: HOME | End: 2023-04-14
Attending: EMERGENCY MEDICINE
Payer: MEDICAID

## 2023-04-14 VITALS
TEMPERATURE: 97.3 F | SYSTOLIC BLOOD PRESSURE: 123 MMHG | RESPIRATION RATE: 16 BRPM | HEART RATE: 63 BPM | DIASTOLIC BLOOD PRESSURE: 68 MMHG | OXYGEN SATURATION: 97 %

## 2023-04-14 DIAGNOSIS — M25.551 RIGHT HIP PAIN: Primary | ICD-10-CM

## 2023-04-14 PROCEDURE — 99283 EMERGENCY DEPT VISIT LOW MDM: CPT

## 2023-04-14 PROCEDURE — 73501 X-RAY EXAM HIP UNI 1 VIEW: CPT

## 2023-04-14 RX ORDER — NAPROXEN 500 MG/1
500 TABLET ORAL 2 TIMES DAILY WITH MEALS
Qty: 20 TABLET | Refills: 0 | Status: SHIPPED | OUTPATIENT
Start: 2023-04-14

## 2023-04-14 RX ORDER — METHOCARBAMOL 500 MG/1
500 TABLET, FILM COATED ORAL 3 TIMES DAILY
Qty: 15 TABLET | Refills: 0 | Status: SHIPPED | OUTPATIENT
Start: 2023-04-14 | End: 2023-04-24

## 2023-04-14 RX ORDER — LIDOCAINE 4 G/G
1 PATCH TOPICAL DAILY
Qty: 30 PATCH | Refills: 0 | Status: SHIPPED | OUTPATIENT
Start: 2023-04-14 | End: 2023-05-14

## 2023-04-14 NOTE — ED TRIAGE NOTES
Pt. Arrives to the ED endorsing right hip pain after picking up a box and feeling a \"vibration\" in his hip with some pain. No obvious deformities noted. Pt. Was ambulatory at the scene per medics.

## 2023-04-21 ASSESSMENT — ENCOUNTER SYMPTOMS
VOMITING: 0
RHINORRHEA: 0
DIARRHEA: 0
ABDOMINAL PAIN: 0
COLOR CHANGE: 0
SHORTNESS OF BREATH: 0

## 2023-04-21 NOTE — ED PROVIDER NOTES
interpretive errors are inadvertently transcribed by the computer software. Please disregard these errors. Please excuse any errors that have escaped final proofreading.       May Yuen PA-C       [place], [place]  04/21/23 1051

## 2023-04-26 ENCOUNTER — OFFICE VISIT (OUTPATIENT)
Age: 28
End: 2023-04-26
Payer: MEDICAID

## 2023-04-26 VITALS
TEMPERATURE: 98 F | OXYGEN SATURATION: 98 % | HEIGHT: 66 IN | HEART RATE: 63 BPM | SYSTOLIC BLOOD PRESSURE: 120 MMHG | WEIGHT: 195 LBS | DIASTOLIC BLOOD PRESSURE: 79 MMHG | BODY MASS INDEX: 31.34 KG/M2

## 2023-04-26 DIAGNOSIS — M54.2 NECK PAIN: Primary | ICD-10-CM

## 2023-04-26 DIAGNOSIS — S43.432A SUPERIOR GLENOID LABRUM LESION OF LEFT SHOULDER, INITIAL ENCOUNTER: ICD-10-CM

## 2023-04-26 DIAGNOSIS — M54.12 LEFT CERVICAL RADICULOPATHY: ICD-10-CM

## 2023-04-26 PROCEDURE — 99203 OFFICE O/P NEW LOW 30 MIN: CPT | Performed by: PHYSICAL MEDICINE & REHABILITATION

## 2023-04-26 NOTE — PROGRESS NOTES
Rajan Atkins. presents today for   Chief Complaint   Patient presents with    Neck Pain     Left front side    Arm Pain     Left     Shoulder Pain     Left        Is someone accompanying this pt? no    Is the patient using any DME equipment during OV? no    Depression Screening:  PHQ-9 Questionaire 4/4/2023 10/4/2022 4/4/2022 4/4/2022   Little interest or pleasure in doing things 3 1 2 2   Feeling down, depressed, or hopeless 2 2 2 2   Trouble falling or staying asleep, or sleeping too much 3 1 2 0   Feeling tired or having little energy 3 3 0 1   Poor appetite or overeating 2 1 0 0   Feeling bad about yourself - or that you are a failure or have let yourself or your family down 3 2 2 2   Trouble concentrating on things, such as reading the newspaper or watching television 3 2 1 1   Moving or speaking so slowly that other people could have noticed. Or the opposite - being so fidgety or restless that you have been moving around a lot more than usual 2 3 0 0   Thoughts that you would be better off dead, or of hurting yourself in some way 1 - - -   PHQ-9 Total Score 22 15 9 8   If you checked off any problems, how difficult have these problems made it for you to do your work, take care of things at home, or get along with other people? 1 - - -     PHQ Scores 4/4/2023 10/4/2022 4/4/2022 4/4/2022 4/6/2021   PHQ2 Score 5 3 4 4 -   PHQ2 Score - - 4 4 2   PHQ9 Score 22 15 9 8 -   PHQ9 Score - - 11 9 10         Coordination of Care:  1. Have you been to the ER, urgent care clinic since your last visit? Yes, pt went to the ER a couple of times for separate things. Once for low blood sugar and the other for right hip pain Hospitalized since your last visit? no    2. Have you seen or consulted any other health care providers outside of the [address] [place] [address] since your last visit? Yes, ortho and pcp Include any pap smears or colon screening.  no
???????? Atraumatic   Respiratory:   Breathing non-labored and non dyspneic   CV: ???????????????? Peripheral pulses intact, no peripheral edema   Skin: ????????????? No rashes       Neurologic: ?? Sensation: normal and grossly intact thebilateral, upper extremity(s)   Strength: 5/5 in the bilateral, upper extremity(s)  Reflexes: reveals 2+ symmetric DTRs throughout UE  Gait: normal   Upper tract signs:  Aden's negative    Musculoskeletal: Cervical Exam   Alignment: Normal  Atrophy: None     Tenderness to Palpation:   Cervical paraspinals: Positive  Cervical spinous processes: Negative  Upper thoracic paraspinals: Negative  Upper thoracic spinous processes: Negative  Upper trapezius:  Positive    Cervical ROM: Normal   Shoulder ROM: No reproduction of pain with movement     Special Tests    Spurlings Maneuver: Negative  Shoulder Abduction Test:Negative  Hoffmans: Negative  Hawkin's Test: Positive- left  Neer's Test: Positive-left  Empty Can Test: Positive-left      Medical Decision Making:    Images: The imaging results as well as the actual images of the studies below were reviewed, visualized and interpreted by me. Labs: The results below were reviewed. X-ray cervical spine AP and lat 4/26/23- straightening of cervical spine,  preserved disc space       Assessment:   -left cervical radiculitis  - left shoulder labral tear     Plan:      -Physical therapy -  referral to PT  -Medications - NA. Counseled regarding side effects and appropriate administration of medications.    -Diagnostics/Imaging - x-ray cervical spine POC   -Injections - NA    -Education - The patient's diagnosis, prognosis and treatment options were discussed today. All questions were answered. F/U - in 8 week(s) or sooner if needed.   Consider emg LUE vs MRI cervical spine          Miley Buckner [phone]

## 2023-08-01 NOTE — PROGRESS NOTES
08/01/23                            Vani RICH Janelle  [address] [facility] [address]    To Whom It May Concern:    This is to certify Vani Luis was evaluated with Cruz Horne MD on 08/01/23 and can return to light work.     RESTRICTIONS: Hope work only    Follow up after MRI                Cruz Horne MD  [facility]  [address] [facility] [address]  Dept Phone: [phone]       [facility] [phone] advising patient of normal x-ray. He was asked to contact \[facility]\"" if he has any questions.